# Patient Record
Sex: MALE | Race: WHITE | URBAN - METROPOLITAN AREA
[De-identification: names, ages, dates, MRNs, and addresses within clinical notes are randomized per-mention and may not be internally consistent; named-entity substitution may affect disease eponyms.]

---

## 2017-04-13 RX ORDER — OFLOXACIN 0.3 %
1 DROPS OPHTHALMIC (EYE)
Qty: 0 | Refills: 0 | Status: COMPLETED | OUTPATIENT
Start: 2017-04-24 | End: 2017-04-24

## 2017-04-13 RX ORDER — CYCLOPENTOLATE HYDROCHLORIDE 10 MG/ML
1 SOLUTION/ DROPS OPHTHALMIC
Qty: 0 | Refills: 0 | Status: COMPLETED | OUTPATIENT
Start: 2017-04-24 | End: 2017-04-24

## 2017-04-13 RX ORDER — TROPICAMIDE 1 %
1 DROPS OPHTHALMIC (EYE)
Qty: 0 | Refills: 0 | Status: COMPLETED | OUTPATIENT
Start: 2017-04-24 | End: 2017-04-24

## 2017-04-13 RX ORDER — PHENYLEPHRINE HCL 2.5 %
1 DROPS OPHTHALMIC (EYE)
Qty: 0 | Refills: 0 | Status: COMPLETED | OUTPATIENT
Start: 2017-04-24 | End: 2017-04-24

## 2017-04-21 NOTE — ASU PATIENT PROFILE, ADULT - PSH
Aneurysm of right iliac artery    H/O peripheral vascular disease    S/P CABG (coronary artery bypass graft)

## 2017-04-24 ENCOUNTER — OUTPATIENT (OUTPATIENT)
Dept: OUTPATIENT SERVICES | Facility: HOSPITAL | Age: 82
LOS: 1 days | Discharge: HOME | End: 2017-04-24

## 2017-04-24 VITALS
HEART RATE: 63 BPM | SYSTOLIC BLOOD PRESSURE: 114 MMHG | OXYGEN SATURATION: 98 % | RESPIRATION RATE: 15 BRPM | TEMPERATURE: 97 F | DIASTOLIC BLOOD PRESSURE: 51 MMHG

## 2017-04-24 VITALS
TEMPERATURE: 97 F | WEIGHT: 160.06 LBS | OXYGEN SATURATION: 100 % | HEIGHT: 70 IN | HEART RATE: 59 BPM | RESPIRATION RATE: 16 BRPM | SYSTOLIC BLOOD PRESSURE: 115 MMHG | DIASTOLIC BLOOD PRESSURE: 69 MMHG

## 2017-04-24 DIAGNOSIS — I72.3 ANEURYSM OF ILIAC ARTERY: Chronic | ICD-10-CM

## 2017-04-24 DIAGNOSIS — Z86.79 PERSONAL HISTORY OF OTHER DISEASES OF THE CIRCULATORY SYSTEM: Chronic | ICD-10-CM

## 2017-04-24 DIAGNOSIS — Z95.1 PRESENCE OF AORTOCORONARY BYPASS GRAFT: Chronic | ICD-10-CM

## 2017-04-24 RX ORDER — CLOPIDOGREL BISULFATE 75 MG/1
1 TABLET, FILM COATED ORAL
Qty: 0 | Refills: 0 | COMMUNITY

## 2017-04-24 RX ORDER — ATORVASTATIN CALCIUM 80 MG/1
1 TABLET, FILM COATED ORAL
Qty: 0 | Refills: 0 | COMMUNITY

## 2017-04-24 RX ORDER — PREGABALIN 225 MG/1
1 CAPSULE ORAL
Qty: 0 | Refills: 0 | COMMUNITY

## 2017-04-24 RX ORDER — ASPIRIN/CALCIUM CARB/MAGNESIUM 324 MG
1 TABLET ORAL
Qty: 0 | Refills: 0 | COMMUNITY

## 2017-04-24 RX ORDER — CHOLECALCIFEROL (VITAMIN D3) 125 MCG
1 CAPSULE ORAL
Qty: 0 | Refills: 0 | COMMUNITY

## 2017-04-24 RX ORDER — UBIDECARENONE 100 MG
1 CAPSULE ORAL
Qty: 0 | Refills: 0 | COMMUNITY

## 2017-04-24 RX ORDER — ACETAMINOPHEN 500 MG
650 TABLET ORAL EVERY 6 HOURS
Qty: 0 | Refills: 0 | Status: DISCONTINUED | OUTPATIENT
Start: 2017-04-24 | End: 2017-05-09

## 2017-04-24 RX ADMIN — CYCLOPENTOLATE HYDROCHLORIDE 1 DROP(S): 10 SOLUTION/ DROPS OPHTHALMIC at 15:23

## 2017-04-24 RX ADMIN — Medication 1 DROP(S): at 14:55

## 2017-04-24 RX ADMIN — Medication 1 DROP(S): at 15:14

## 2017-04-24 RX ADMIN — Medication 1 DROP(S): at 15:15

## 2017-04-24 RX ADMIN — CYCLOPENTOLATE HYDROCHLORIDE 1 DROP(S): 10 SOLUTION/ DROPS OPHTHALMIC at 15:05

## 2017-04-24 RX ADMIN — Medication 1 DROP(S): at 15:23

## 2017-04-24 RX ADMIN — Medication 1 DROP(S): at 15:05

## 2017-04-24 RX ADMIN — CYCLOPENTOLATE HYDROCHLORIDE 1 DROP(S): 10 SOLUTION/ DROPS OPHTHALMIC at 15:14

## 2017-04-24 NOTE — ASU DISCHARGE PLAN (ADULT/PEDIATRIC). - NURSING INSTRUCTIONS
Begin with liquids and light food ( tea, toast, Jello, soups). Advance to what you normally eat. Liquids should taken in adequate amounts today.     CALL the DOCTOR:    -Fever greater than  101F  - Signs  of infection such as : increase pain,swelling,redness,or a bad  smell coming from the wound.  -Excessive amount of bleeding.  - Any pain that appears to be getting worse.  - Vomiting  -  If you have  not urinated 8 hours after surgery or have any difficulty urinating.     A responsible adult should be with you for the rest of the day and night for your safety and to help you if you needed.    Review attached FACT SHEET if applicable.   For any problems or concerns,contact your doctor. [facility] patients should call the [facility]. If you cannot reach the doctor or clinic, call [facility] Emergency Department at [phone] or go to your local Emergency Department.  A responsible adult should be with you for the rest of the day and night for your safety and to help you if you needed. Resume your medications as listed on the attached Medication Record.

## 2017-04-24 NOTE — BRIEF OPERATIVE NOTE - PROCEDURE
Cataract extract, extracaps, phacoemuls, w/ prosth lens insertn, 1 stage  04/24/2017    Active  KRISTEN

## 2017-04-24 NOTE — BRIEF OPERATIVE NOTE - PRE-OP DX
Age-related nuclear cataract of right eye  04/24/2017    Juanjo Melchor  Cataract (lens) fragments in eye following cataract surgery, right eye  04/24/2017    Juanjo Melchor

## 2017-04-27 DIAGNOSIS — H25.11 AGE-RELATED NUCLEAR CATARACT, RIGHT EYE: ICD-10-CM

## 2017-04-27 DIAGNOSIS — Z95.1 PRESENCE OF AORTOCORONARY BYPASS GRAFT: ICD-10-CM

## 2017-04-27 DIAGNOSIS — Z87.891 PERSONAL HISTORY OF NICOTINE DEPENDENCE: ICD-10-CM

## 2017-04-27 DIAGNOSIS — Z95.5 PRESENCE OF CORONARY ANGIOPLASTY IMPLANT AND GRAFT: ICD-10-CM

## 2017-04-27 DIAGNOSIS — I25.10 ATHEROSCLEROTIC HEART DISEASE OF NATIVE CORONARY ARTERY WITHOUT ANGINA PECTORIS: ICD-10-CM

## 2017-04-27 DIAGNOSIS — E78.5 HYPERLIPIDEMIA, UNSPECIFIED: ICD-10-CM

## 2017-04-27 DIAGNOSIS — I10 ESSENTIAL (PRIMARY) HYPERTENSION: ICD-10-CM

## 2017-04-27 DIAGNOSIS — Z86.718 PERSONAL HISTORY OF OTHER VENOUS THROMBOSIS AND EMBOLISM: ICD-10-CM

## 2024-03-14 ENCOUNTER — INPATIENT (INPATIENT)
Facility: HOSPITAL | Age: 89
LOS: 6 days | Discharge: NURSING, REHAB OR LONG-TERM CARE | DRG: 535 | End: 2024-03-21
Attending: SURGERY | Admitting: SURGERY
Payer: MEDICARE

## 2024-03-14 VITALS
WEIGHT: 145.06 LBS | RESPIRATION RATE: 24 BRPM | HEIGHT: 70 IN | OXYGEN SATURATION: 92 % | DIASTOLIC BLOOD PRESSURE: 47 MMHG | SYSTOLIC BLOOD PRESSURE: 106 MMHG | HEART RATE: 73 BPM

## 2024-03-14 DIAGNOSIS — Z95.1 PRESENCE OF AORTOCORONARY BYPASS GRAFT: Chronic | ICD-10-CM

## 2024-03-14 DIAGNOSIS — I72.3 ANEURYSM OF ILIAC ARTERY: Chronic | ICD-10-CM

## 2024-03-14 DIAGNOSIS — R57.8 OTHER SHOCK: ICD-10-CM

## 2024-03-14 DIAGNOSIS — Z86.79 PERSONAL HISTORY OF OTHER DISEASES OF THE CIRCULATORY SYSTEM: Chronic | ICD-10-CM

## 2024-03-14 LAB
ALBUMIN SERPL ELPH-MCNC: 3.2 G/DL — LOW (ref 3.3–5)
ALP SERPL-CCNC: 104 U/L — SIGNIFICANT CHANGE UP (ref 40–120)
ALT FLD-CCNC: 46 U/L — SIGNIFICANT CHANGE UP (ref 12–78)
ANION GAP SERPL CALC-SCNC: 9 MMOL/L — SIGNIFICANT CHANGE UP (ref 5–17)
ANISOCYTOSIS BLD QL: SIGNIFICANT CHANGE UP
APPEARANCE UR: CLEAR — SIGNIFICANT CHANGE UP
APTT BLD: 25.5 SEC — SIGNIFICANT CHANGE UP (ref 24.5–35.6)
AST SERPL-CCNC: 74 U/L — HIGH (ref 15–37)
BACTERIA # UR AUTO: NEGATIVE /HPF — SIGNIFICANT CHANGE UP
BASE EXCESS BLDV CALC-SCNC: -4 MMOL/L — LOW (ref -2–3)
BASE EXCESS BLDV CALC-SCNC: -4.6 MMOL/L — LOW (ref -2–3)
BASOPHILS # BLD AUTO: 0.01 K/UL — SIGNIFICANT CHANGE UP (ref 0–0.2)
BASOPHILS NFR BLD AUTO: 0.1 % — SIGNIFICANT CHANGE UP (ref 0–2)
BILIRUB SERPL-MCNC: 1.7 MG/DL — HIGH (ref 0.2–1.2)
BILIRUB UR-MCNC: NEGATIVE — SIGNIFICANT CHANGE UP
BIZARRE PLATELETS BLD QL SMEAR: PRESENT — SIGNIFICANT CHANGE UP
BLD GP AB SCN SERPL QL: SIGNIFICANT CHANGE UP
BLOOD GAS COMMENTS, VENOUS: SIGNIFICANT CHANGE UP
BUN SERPL-MCNC: 53 MG/DL — HIGH (ref 7–23)
BURR CELLS BLD QL SMEAR: PRESENT — SIGNIFICANT CHANGE UP
CALCIUM SERPL-MCNC: 8.6 MG/DL — SIGNIFICANT CHANGE UP (ref 8.5–10.1)
CAST: 43 /LPF — HIGH (ref 0–4)
CHLORIDE SERPL-SCNC: 110 MMOL/L — HIGH (ref 96–108)
CK SERPL-CCNC: 2097 U/L — HIGH (ref 26–308)
CK SERPL-CCNC: 2106 U/L — HIGH (ref 26–308)
CO2 SERPL-SCNC: 21 MMOL/L — LOW (ref 22–31)
COLOR SPEC: YELLOW — SIGNIFICANT CHANGE UP
CREAT SERPL-MCNC: 2.35 MG/DL — HIGH (ref 0.5–1.3)
DIFF PNL FLD: ABNORMAL
EGFR: 25 ML/MIN/1.73M2 — LOW
ELLIPTOCYTES BLD QL SMEAR: SLIGHT — SIGNIFICANT CHANGE UP
EOSINOPHIL # BLD AUTO: 0 K/UL — SIGNIFICANT CHANGE UP (ref 0–0.5)
EOSINOPHIL NFR BLD AUTO: 0 % — SIGNIFICANT CHANGE UP (ref 0–6)
FLUAV AG NPH QL: SIGNIFICANT CHANGE UP
FLUBV AG NPH QL: SIGNIFICANT CHANGE UP
GAS PNL BLDV: SIGNIFICANT CHANGE UP
GLUCOSE SERPL-MCNC: 109 MG/DL — HIGH (ref 70–99)
GLUCOSE UR QL: NEGATIVE MG/DL — SIGNIFICANT CHANGE UP
HCO3 BLDV-SCNC: 19 MMOL/L — LOW (ref 22–29)
HCO3 BLDV-SCNC: 20 MMOL/L — LOW (ref 22–29)
HCT VFR BLD CALC: 19.3 % — CRITICAL LOW (ref 39–50)
HCT VFR BLD CALC: 19.8 % — CRITICAL LOW (ref 39–50)
HGB BLD-MCNC: 5.9 G/DL — CRITICAL LOW (ref 13–17)
HGB BLD-MCNC: 6.5 G/DL — CRITICAL LOW (ref 13–17)
HYALINE CASTS # UR AUTO: PRESENT
HYPOCHROMIA BLD QL: SIGNIFICANT CHANGE UP
IMM GRANULOCYTES NFR BLD AUTO: 0.3 % — SIGNIFICANT CHANGE UP (ref 0–0.9)
INR BLD: 1.27 RATIO — HIGH (ref 0.85–1.18)
KETONES UR-MCNC: NEGATIVE MG/DL — SIGNIFICANT CHANGE UP
LACTATE SERPL-SCNC: 1.7 MMOL/L — SIGNIFICANT CHANGE UP (ref 0.7–2)
LACTATE SERPL-SCNC: 2.7 MMOL/L — HIGH (ref 0.7–2)
LEUKOCYTE ESTERASE UR-ACNC: NEGATIVE — SIGNIFICANT CHANGE UP
LYMPHOCYTES # BLD AUTO: 0.53 K/UL — LOW (ref 1–3.3)
LYMPHOCYTES # BLD AUTO: 4.9 % — LOW (ref 13–44)
MACROCYTES BLD QL: SLIGHT — SIGNIFICANT CHANGE UP
MAGNESIUM SERPL-MCNC: 2.4 MG/DL — SIGNIFICANT CHANGE UP (ref 1.6–2.6)
MANUAL SMEAR VERIFICATION: SIGNIFICANT CHANGE UP
MCHC RBC-ENTMCNC: 25.5 PG — LOW (ref 27–34)
MCHC RBC-ENTMCNC: 26.4 PG — LOW (ref 27–34)
MCHC RBC-ENTMCNC: 30.6 GM/DL — LOW (ref 32–36)
MCHC RBC-ENTMCNC: 32.8 GM/DL — SIGNIFICANT CHANGE UP (ref 32–36)
MCV RBC AUTO: 80.5 FL — SIGNIFICANT CHANGE UP (ref 80–100)
MCV RBC AUTO: 83.5 FL — SIGNIFICANT CHANGE UP (ref 80–100)
MICROCYTES BLD QL: SIGNIFICANT CHANGE UP
MONOCYTES # BLD AUTO: 0.82 K/UL — SIGNIFICANT CHANGE UP (ref 0–0.9)
MONOCYTES NFR BLD AUTO: 7.6 % — SIGNIFICANT CHANGE UP (ref 2–14)
NEUTROPHILS # BLD AUTO: 9.36 K/UL — HIGH (ref 1.8–7.4)
NEUTROPHILS NFR BLD AUTO: 87.1 % — HIGH (ref 43–77)
NITRITE UR-MCNC: NEGATIVE — SIGNIFICANT CHANGE UP
NT-PROBNP SERPL-SCNC: 4291 PG/ML — HIGH (ref 0–450)
OVALOCYTES BLD QL SMEAR: SLIGHT — SIGNIFICANT CHANGE UP
PCO2 BLDV: 32 MMHG — LOW (ref 42–55)
PCO2 BLDV: 32 MMHG — LOW (ref 42–55)
PH BLDV: 7.39 — SIGNIFICANT CHANGE UP (ref 7.32–7.43)
PH BLDV: 7.4 — SIGNIFICANT CHANGE UP (ref 7.32–7.43)
PH UR: 5 — SIGNIFICANT CHANGE UP (ref 5–8)
PHOSPHATE SERPL-MCNC: 5.6 MG/DL — HIGH (ref 2.5–4.5)
PLAT MORPH BLD: NORMAL — SIGNIFICANT CHANGE UP
PLATELET # BLD AUTO: 103 K/UL — LOW (ref 150–400)
PLATELET # BLD AUTO: 116 K/UL — LOW (ref 150–400)
PO2 BLDV: 144 MMHG — HIGH (ref 25–45)
PO2 BLDV: 204 MMHG — HIGH (ref 25–45)
POIKILOCYTOSIS BLD QL AUTO: SIGNIFICANT CHANGE UP
POLYCHROMASIA BLD QL SMEAR: SLIGHT — SIGNIFICANT CHANGE UP
POTASSIUM SERPL-MCNC: 4.7 MMOL/L — SIGNIFICANT CHANGE UP (ref 3.5–5.3)
POTASSIUM SERPL-SCNC: 4.7 MMOL/L — SIGNIFICANT CHANGE UP (ref 3.5–5.3)
PROT SERPL-MCNC: 5.6 GM/DL — LOW (ref 6–8.3)
PROT UR-MCNC: NEGATIVE MG/DL — SIGNIFICANT CHANGE UP
PROTHROM AB SERPL-ACNC: 14.2 SEC — HIGH (ref 9.5–13)
RBC # BLD: 2.31 M/UL — LOW (ref 4.2–5.8)
RBC # BLD: 2.46 M/UL — LOW (ref 4.2–5.8)
RBC # FLD: 17.7 % — HIGH (ref 10.3–14.5)
RBC # FLD: 18.2 % — HIGH (ref 10.3–14.5)
RBC BLD AUTO: ABNORMAL
RBC CASTS # UR COMP ASSIST: 38 /HPF — HIGH (ref 0–4)
ROULEAUX BLD QL SMEAR: PRESENT
RSV RNA NPH QL NAA+NON-PROBE: SIGNIFICANT CHANGE UP
SAO2 % BLDV: 98 % — HIGH (ref 67–88)
SAO2 % BLDV: 98 % — HIGH (ref 67–88)
SARS-COV-2 RNA SPEC QL NAA+PROBE: SIGNIFICANT CHANGE UP
SODIUM SERPL-SCNC: 140 MMOL/L — SIGNIFICANT CHANGE UP (ref 135–145)
SP GR SPEC: 1.02 — SIGNIFICANT CHANGE UP (ref 1–1.03)
SPHEROCYTES BLD QL SMEAR: SIGNIFICANT CHANGE UP
SQUAMOUS # UR AUTO: 1 /HPF — SIGNIFICANT CHANGE UP (ref 0–5)
TARGETS BLD QL SMEAR: SLIGHT — SIGNIFICANT CHANGE UP
TOXIC GRANULES BLD QL SMEAR: PRESENT — SIGNIFICANT CHANGE UP
TROPONIN I, HIGH SENSITIVITY RESULT: 1079.88 NG/L — HIGH
TROPONIN I, HIGH SENSITIVITY RESULT: 393.8 NG/L — HIGH
TSH SERPL-MCNC: 5.82 UU/ML — HIGH (ref 0.34–4.82)
UROBILINOGEN FLD QL: NORMAL MG/DL — SIGNIFICANT CHANGE UP (ref 0.2–1)
WBC # BLD: 10.75 K/UL — HIGH (ref 3.8–10.5)
WBC # BLD: 9.6 K/UL — SIGNIFICANT CHANGE UP (ref 3.8–10.5)
WBC # FLD AUTO: 10.75 K/UL — HIGH (ref 3.8–10.5)
WBC # FLD AUTO: 9.6 K/UL — SIGNIFICANT CHANGE UP (ref 3.8–10.5)
WBC UR QL: 8 /HPF — HIGH (ref 0–5)

## 2024-03-14 PROCEDURE — 86922 COMPATIBILITY TEST ANTIGLOB: CPT

## 2024-03-14 PROCEDURE — 99223 1ST HOSP IP/OBS HIGH 75: CPT | Mod: GC

## 2024-03-14 PROCEDURE — 86920 COMPATIBILITY TEST SPIN: CPT

## 2024-03-14 PROCEDURE — 85027 COMPLETE CBC AUTOMATED: CPT

## 2024-03-14 PROCEDURE — 73552 X-RAY EXAM OF FEMUR 2/>: CPT | Mod: 26,LT

## 2024-03-14 PROCEDURE — 70450 CT HEAD/BRAIN W/O DYE: CPT | Mod: 26,MC

## 2024-03-14 PROCEDURE — 93010 ELECTROCARDIOGRAM REPORT: CPT

## 2024-03-14 PROCEDURE — 83605 ASSAY OF LACTIC ACID: CPT

## 2024-03-14 PROCEDURE — 73610 X-RAY EXAM OF ANKLE: CPT | Mod: LT

## 2024-03-14 PROCEDURE — 73562 X-RAY EXAM OF KNEE 3: CPT | Mod: LT

## 2024-03-14 PROCEDURE — 71045 X-RAY EXAM CHEST 1 VIEW: CPT | Mod: 26

## 2024-03-14 PROCEDURE — 99285 EMERGENCY DEPT VISIT HI MDM: CPT

## 2024-03-14 PROCEDURE — 84480 ASSAY TRIIODOTHYRONINE (T3): CPT

## 2024-03-14 PROCEDURE — P9016: CPT

## 2024-03-14 PROCEDURE — 80053 COMPREHEN METABOLIC PANEL: CPT

## 2024-03-14 PROCEDURE — 85730 THROMBOPLASTIN TIME PARTIAL: CPT

## 2024-03-14 PROCEDURE — 93306 TTE W/DOPPLER COMPLETE: CPT

## 2024-03-14 PROCEDURE — 87086 URINE CULTURE/COLONY COUNT: CPT

## 2024-03-14 PROCEDURE — 83735 ASSAY OF MAGNESIUM: CPT

## 2024-03-14 PROCEDURE — 86921 COMPATIBILITY TEST INCUBATE: CPT

## 2024-03-14 PROCEDURE — 72125 CT NECK SPINE W/O DYE: CPT | Mod: 26,MC

## 2024-03-14 PROCEDURE — 36430 TRANSFUSION BLD/BLD COMPNT: CPT

## 2024-03-14 PROCEDURE — 87040 BLOOD CULTURE FOR BACTERIA: CPT

## 2024-03-14 PROCEDURE — 73590 X-RAY EXAM OF LOWER LEG: CPT | Mod: 26,LT

## 2024-03-14 PROCEDURE — 92610 EVALUATE SWALLOWING FUNCTION: CPT | Mod: GN

## 2024-03-14 PROCEDURE — 85610 PROTHROMBIN TIME: CPT

## 2024-03-14 PROCEDURE — 85025 COMPLETE CBC W/AUTO DIFF WBC: CPT

## 2024-03-14 PROCEDURE — 73610 X-RAY EXAM OF ANKLE: CPT | Mod: 26,LT

## 2024-03-14 PROCEDURE — 74177 CT ABD & PELVIS W/CONTRAST: CPT | Mod: 26,MC

## 2024-03-14 PROCEDURE — 99291 CRITICAL CARE FIRST HOUR: CPT

## 2024-03-14 PROCEDURE — 80048 BASIC METABOLIC PNL TOTAL CA: CPT

## 2024-03-14 PROCEDURE — 80202 ASSAY OF VANCOMYCIN: CPT

## 2024-03-14 PROCEDURE — 72170 X-RAY EXAM OF PELVIS: CPT

## 2024-03-14 PROCEDURE — 84484 ASSAY OF TROPONIN QUANT: CPT

## 2024-03-14 PROCEDURE — 73552 X-RAY EXAM OF FEMUR 2/>: CPT | Mod: LT

## 2024-03-14 PROCEDURE — 97116 GAIT TRAINING THERAPY: CPT | Mod: GP

## 2024-03-14 PROCEDURE — 87186 SC STD MICRODIL/AGAR DIL: CPT

## 2024-03-14 PROCEDURE — 73502 X-RAY EXAM HIP UNI 2-3 VIEWS: CPT | Mod: LT

## 2024-03-14 PROCEDURE — 71260 CT THORAX DX C+: CPT | Mod: 26,MC

## 2024-03-14 PROCEDURE — 73562 X-RAY EXAM OF KNEE 3: CPT | Mod: 26,LT

## 2024-03-14 PROCEDURE — 84436 ASSAY OF TOTAL THYROXINE: CPT

## 2024-03-14 PROCEDURE — 73502 X-RAY EXAM HIP UNI 2-3 VIEWS: CPT | Mod: 26,LT

## 2024-03-14 PROCEDURE — 36415 COLL VENOUS BLD VENIPUNCTURE: CPT

## 2024-03-14 PROCEDURE — 97162 PT EVAL MOD COMPLEX 30 MIN: CPT | Mod: GP

## 2024-03-14 PROCEDURE — 82550 ASSAY OF CK (CPK): CPT

## 2024-03-14 PROCEDURE — 84439 ASSAY OF FREE THYROXINE: CPT

## 2024-03-14 PROCEDURE — 84100 ASSAY OF PHOSPHORUS: CPT

## 2024-03-14 PROCEDURE — 73590 X-RAY EXAM OF LOWER LEG: CPT | Mod: LT

## 2024-03-14 PROCEDURE — 97530 THERAPEUTIC ACTIVITIES: CPT | Mod: GP

## 2024-03-14 PROCEDURE — 92523 SPEECH SOUND LANG COMPREHEN: CPT | Mod: GN

## 2024-03-14 PROCEDURE — 86923 COMPATIBILITY TEST ELECTRIC: CPT

## 2024-03-14 PROCEDURE — 81001 URINALYSIS AUTO W/SCOPE: CPT

## 2024-03-14 PROCEDURE — 82803 BLOOD GASES ANY COMBINATION: CPT

## 2024-03-14 RX ORDER — CHOLECALCIFEROL (VITAMIN D3) 125 MCG
1 CAPSULE ORAL
Refills: 0 | DISCHARGE

## 2024-03-14 RX ORDER — SODIUM CHLORIDE 9 MG/ML
1000 INJECTION, SOLUTION INTRAVENOUS ONCE
Refills: 0 | Status: DISCONTINUED | OUTPATIENT
Start: 2024-03-14 | End: 2024-03-14

## 2024-03-14 RX ORDER — MULTIVIT-MIN/FERROUS GLUCONATE 9 MG/15 ML
1 LIQUID (ML) ORAL
Refills: 0 | DISCHARGE

## 2024-03-14 RX ORDER — ACETAMINOPHEN 500 MG
1000 TABLET ORAL ONCE
Refills: 0 | Status: DISCONTINUED | OUTPATIENT
Start: 2024-03-14 | End: 2024-03-21

## 2024-03-14 RX ORDER — ASPIRIN/CALCIUM CARB/MAGNESIUM 324 MG
1 TABLET ORAL
Refills: 0 | DISCHARGE

## 2024-03-14 RX ORDER — DESMOPRESSIN ACETATE 0.1 MG/1
20 TABLET ORAL ONCE
Refills: 0 | Status: COMPLETED | OUTPATIENT
Start: 2024-03-14 | End: 2024-03-15

## 2024-03-14 RX ORDER — ACETAMINOPHEN 500 MG
1000 TABLET ORAL ONCE
Refills: 0 | Status: COMPLETED | OUTPATIENT
Start: 2024-03-14 | End: 2024-03-14

## 2024-03-14 RX ORDER — ONDANSETRON 8 MG/1
4 TABLET, FILM COATED ORAL EVERY 6 HOURS
Refills: 0 | Status: DISCONTINUED | OUTPATIENT
Start: 2024-03-14 | End: 2024-03-21

## 2024-03-14 RX ORDER — GABAPENTIN 400 MG/1
100 CAPSULE ORAL THREE TIMES A DAY
Refills: 0 | Status: DISCONTINUED | OUTPATIENT
Start: 2024-03-14 | End: 2024-03-21

## 2024-03-14 RX ORDER — PIPERACILLIN AND TAZOBACTAM 4; .5 G/20ML; G/20ML
3.38 INJECTION, POWDER, LYOPHILIZED, FOR SOLUTION INTRAVENOUS ONCE
Refills: 0 | Status: COMPLETED | OUTPATIENT
Start: 2024-03-14 | End: 2024-03-14

## 2024-03-14 RX ORDER — DULOXETINE HYDROCHLORIDE 30 MG/1
1 CAPSULE, DELAYED RELEASE ORAL
Refills: 0 | DISCHARGE

## 2024-03-14 RX ORDER — HYDROMORPHONE HYDROCHLORIDE 2 MG/ML
0.5 INJECTION INTRAMUSCULAR; INTRAVENOUS; SUBCUTANEOUS EVERY 4 HOURS
Refills: 0 | Status: DISCONTINUED | OUTPATIENT
Start: 2024-03-14 | End: 2024-03-19

## 2024-03-14 RX ORDER — UBIDECARENONE 100 MG
1 CAPSULE ORAL
Refills: 0 | DISCHARGE

## 2024-03-14 RX ORDER — VANCOMYCIN HCL 1 G
1000 VIAL (EA) INTRAVENOUS ONCE
Refills: 0 | Status: COMPLETED | OUTPATIENT
Start: 2024-03-14 | End: 2024-03-14

## 2024-03-14 RX ORDER — SODIUM CHLORIDE 9 MG/ML
1000 INJECTION, SOLUTION INTRAVENOUS
Refills: 0 | Status: DISCONTINUED | OUTPATIENT
Start: 2024-03-14 | End: 2024-03-14

## 2024-03-14 RX ORDER — HYDROCORTISONE 20 MG
100 TABLET ORAL ONCE
Refills: 0 | Status: COMPLETED | OUTPATIENT
Start: 2024-03-14 | End: 2024-03-14

## 2024-03-14 RX ORDER — ATORVASTATIN CALCIUM 80 MG/1
1 TABLET, FILM COATED ORAL
Refills: 0 | DISCHARGE

## 2024-03-14 RX ORDER — DESMOPRESSIN ACETATE 0.1 MG/1
20 TABLET ORAL ONCE
Refills: 0 | Status: DISCONTINUED | OUTPATIENT
Start: 2024-03-14 | End: 2024-03-14

## 2024-03-14 RX ORDER — LIDOCAINE 4 G/100G
1 CREAM TOPICAL DAILY
Refills: 0 | Status: DISCONTINUED | OUTPATIENT
Start: 2024-03-14 | End: 2024-03-21

## 2024-03-14 RX ADMIN — Medication 1000 MILLIGRAM(S): at 23:10

## 2024-03-14 RX ADMIN — PIPERACILLIN AND TAZOBACTAM 200 GRAM(S): 4; .5 INJECTION, POWDER, LYOPHILIZED, FOR SOLUTION INTRAVENOUS at 16:35

## 2024-03-14 RX ADMIN — Medication 100 MILLIGRAM(S): at 17:10

## 2024-03-14 RX ADMIN — Medication 250 MILLIGRAM(S): at 17:10

## 2024-03-14 RX ADMIN — Medication 400 MILLIGRAM(S): at 22:55

## 2024-03-14 NOTE — PATIENT PROFILE ADULT - FALL HARM RISK - HARM RISK INTERVENTIONS
Communicate Risk of Fall with Harm to all staff/Discuss with provider need for PT consult/Monitor for mental status changes/Monitor gait and stability/Move patient closer to nurses' station/Provide patient with walking aids - walker, cane, crutches/Reinforce activity limits and safety measures with patient and family/Reorient to person, place and time as needed/Review medications for side effects contributing to fall risk/Tailored Fall Risk Interventions/Toileting schedule using arm’s reach rule for commode and bathroom/Use of alarms - bed, chair and/or voice tab/Visual Cue: Yellow wristband and red socks/Bed in lowest position, wheels locked, appropriate side rails in place/Call bell, personal items and telephone in reach/Instruct patient to call for assistance before getting out of bed or chair/Physically safe environment - no spills, clutter or unnecessary equipment/Purposeful Proactive Rounding/Room/bathroom lighting operational, light cord in reach/Unable to comprehend

## 2024-03-14 NOTE — ED ADULT TRIAGE NOTE - CHIEF COMPLAINT QUOTE
pt BIBEMS form home s/p unwitnessed fall at 0700 this morning. pt with hx dementia, private aide reports pt will normally get up and walk around but did not today, so she called EMS. pt awake and alert in triage denies pain at this time. pt able to move all extremities. MD Pimentel in triage. neuro alert called [id number]. pt taken directly to CT scan and will be placed in view of nurses' station for safety. pt BIBEMS form home s/p unwitnessed fall at 0700 this morning. pt with hx dementia, private aide reports pt will normally get up and walk around but did not today, so she called EMS. pt awake and alert in triage denies pain at this time. pt able to move all extremities. MD Pimentel in triage. neuro alert called [id number].

## 2024-03-14 NOTE — H&P ADULT - ASSESSMENT
89yo M presents s/p unwitnessed fall  primary survey intact  CT demonstrated communited left acetabular/pelvic fracture with moderate hematoma in left retroperitoneum/pelvis/iliopsoas muscle  CT head negative    Plan:  - admit to trauma to ICU. hemorrhage protocol  - pain control prn  - monitor VS  - cardiology consult  - diet: NPO  - continue IVF  - orthopedic surgery consult  - SCD for DVT PPX, hold pharmaceutical ppx and aspirin    Plan discussed with trauma surgery team and attending, Dr. Restrepo 89yo M presents s/p unwitnessed fall  primary survey intact  CT demonstrated communited left acetabular/pelvic fracture with moderate hematoma in left retroperitoneum/pelvis/iliopsoas muscle  CT head negative    Per family discussion, discussed with daughter, family agrees with conservative treatment, but would not want any surgical intervention. verbal DNR/DNI    Plan:  - admit to trauma to ICU. hemorrhage protocol  - pain control prn  - monitor VS  - cardiology consult  - diet: NPO  - continue IVF  - orthopedic surgery consult  - SCD for DVT PPX, hold pharmaceutical ppx and aspirin    Plan discussed with trauma surgery team and attending, Dr. Restrepo

## 2024-03-14 NOTE — PROGRESS NOTE ADULT - ASSESSMENT
89 year old on aspirin s/p fall last night  with acetabular, rami fracture displaced with Acute blood loss anemia  hgb now 5.9, unclear what it was on admission  creatinine 2.3, no known kidney disease  JVD  dec lv function on POCUS  jvvd  Patient not sob    1. transfuse, serial h/h  2. ddavp  3. possible rhabdo will trend cpk  4/ concern over chf jvd, inc troponin  4. BALWINDER monitor, got contrast  5. Grahamt DNR/DNI    d/w trauma surgeryy Dr. Restrepo

## 2024-03-14 NOTE — CONSULT NOTE ADULT - SUBJECTIVE AND OBJECTIVE BOX
ORTHO CONSULT NOTE:    Patient is a 89yMale DNR/DNI who presents to [place] ED s/p unwitnessed fall. Initially hypotensive w/ MAPs 60s and rectal T 94.4F in trauma bay - MTP was initiated. Pt stabilized w/ improved MAPs and SBPs to 90-100s at bedside. CTA was conducted which revealed a retroperitoneal/pelvic hematoma without extravasation as well as a L acetabular fx, prompting consult. Pt subjective limited 2/2 mental status, currently AOx2 and does not remember events. Denying numbness/tingling or pain anywhere other than L hip at this time. Spoke to family at bedside who reported unwitnessed fall and baseline ambulatory status of minimal ambulator w/ a walker. States inability to walk immediately following the injury. No other orthopedic concerns at this time.    HTN (hypertension)    Hyperlipidemia    CAD (coronary artery disease)        No Known Allergies      PHYSICAL EXAM:  T(C): 35.5 (03-14-24 @ 19:45), Max: 35.5 (03-14-24 @ 19:45)  HR: 72 (03-14-24 @ 19:45) (63 - 73)  BP: 125/84 (03-14-24 @ 19:45) (106/47 - 125/84)  RR: 22 (03-14-24 @ 19:45) (15 - 26)  SpO2: 100% (03-14-24 @ 19:45) (92% - 100%)    Gen: AOx2, Resting comfortably w/ Yudi hugger    LLE:  Skin intact, no erythema or ecchymosis appreciated at area of injury  TTP throughout R hip and pelvis, otherwise NTTP  +EHL/FHL/TA/GSC  +SILT L3-S1  + DP/PT  Compartments soft and compressible  No calf tenderness    Secondary Assessment:  NC/AT, NTTP of clavicles, NTTP of C-,T-,L-Spine, NTTP of Pelvis  UEs: +superficial abrasion at R elbow, +multiple ecchymotic regions, NTTP of Shoulders, Elbows, Wrists, Hands; NT with AROM/PROM of Shoulders, Elbows, Wrists, Hands; AIN/PIN/Med/Uln/Msc/Rad/Ax intact  RLE: Able to SLR, NT with Log Roll, NT with Heel Strike, NTTP of Hip, Knee, Ankle, Foot; NT with AROM/PROM of Hip, Knee, Ankle, Foot; Q/H/Gsc/TA/EHL/FHL intact     IMAGING:    CTA CAP: Comminuted left acetabular fx w/ slight intrapelvic shift of the femoral head in relation to contralateral side. No other acute obvious fxs appreciated.    XR Pelvis + L Hip/Fem/Knee/Tib/Ankle: Acetabular fx as mentioned. No other acute obvious fxs appreciated.    A/P: 89M w/ L comminuted acetabular fx  Admitted to ICU, appreciate primary care  Per conversation w/ trauma sx low concern for acute bleed given lack of extravasation on CTA - plan to monitor in ICU  If continued HD instability or further decompensates would recommend urgent IR consult for possible embolization  Family undecided re: surgical repair of acetabulum but leaning towards no sx - will follow up on decision  If surgical tx of fx is desired, will likely require transfer to level 1 trauma center for ORIF  Currently no acute ortho surgery/intervention indicated  Placed in Buck traction in the meantime for pain relief and improved hip positioning  NWB RLE  Multimodal analgesia  DVT ppx: per primary - recommend SCDs bilaterally in setting of hematoma  PT/OT when tolerable  Ice as tolerated  Discussed with attending Dr. Nair who is in agreement with above plan, will update as needed

## 2024-03-14 NOTE — H&P ADULT - HISTORY OF PRESENT ILLNESS
Trauma Surgery  Trauma Code  Trauma Activation Time: 3:25PM  Trauma Team Arrival Time: 3:17PM  Pre-Notification: 3:15PM  Patient Name: Buck Bravo  Identification: [id number], 89yo M  Location: trauma bay      89yo M w/ hx of CABG and EVAR biliac aneurysm repair presents s/p unwitnessed fall this morning. Most of history obtained from daughter. Patient was found on floor, denies loss of conscious or head strike. Patient is on aspirin. Patient currently complains of pain in lower abd.    A: Airway intact. Patient speaking in full complete sentences  B: Bilateral breath sounds on auscultation w/o tachypnea  C: +2 radial, ulnar, carotid, femoral, popliteal, DP/PT  D: GCS15, PERRLA, moves all 4 extremities    in ED, FAST negative. hypotensive MAP 60s, nontachycardia 70s. MTP activated due to hypotension, received 2u.  hypothermic, rectal temp 94.4F

## 2024-03-14 NOTE — H&P ADULT - NSHPLABSRESULTS_GEN_ALL_CORE
< from: CT Abdomen and Pelvis w/ IV Cont (03.14.24 @ 16:03) >    IMPRESSION:    Markedly comminuted left acetabular/left iliac bone fracture.    Moderate amount of hemorrhage in the left retroperitoneum, left pelvis   and left iliopsoas muscle.    Nondisplaced fracture of the left inferior pubic ramus.      < end of copied text >    < from: CT Head No Cont (03.14.24 @ 16:02) >    IMPRESSION:    CT HEAD: Mild periventricular white matter ischemia.    Moderate temporal   lobe atrophy.    CT cervical spine:   No vertebral fracture is recognized.  Moderate to   severe degenerative disc disease and spondylosis at C4-5 through C7-T1   with narrowing of the BILATERAL C3-4 through C7-T1 neural foramina due to   uncovertebral spurring and facet osteophytic hypertrophy. Marked RIGHT   C2-3 facet osteophytic hypertrophy is also noted. Posterior osteophytic   ridge/disc complexes at C4-5 through C7-T1 abut the ventral cord.      < end of copied text >

## 2024-03-14 NOTE — H&P ADULT - NSICDXPASTSURGICALHX_GEN_ALL_CORE_FT
PAST SURGICAL HISTORY:  Aneurysm of right iliac artery     H/O peripheral vascular disease     S/P CABG (coronary artery bypass graft)

## 2024-03-14 NOTE — ED PROVIDER NOTE - CLINICAL SUMMARY MEDICAL DECISION MAKING FREE TEXT BOX
Hx of CABG and AAA s/p repair presenting with unwitnessed fall found to be hypotensive and hypothermic. Pale appearing with tender abdomen. MAP from 55-60. HR normal. Satting well on RA. Cold to the touch. Lethargic but arousable, tracking and following commands. Mildly confused. Otherwise moving arms and legs. Conjunctival pallor. POCUS FAST negative, LVEF moderately diminished, no RV dilation, no pericardial effusion. Will check sepsis labs, warm him, antibiotics and steroids (possibly adrenal insuffiencey), MTP, trauma activation, TBA.

## 2024-03-14 NOTE — CONSULT NOTE ADULT - ASSESSMENT
ICU ASSESSMENT AND PLAN:   89yo M with PMH: CAD/CABG x3, s/p EVAR bilat iliac aneurysm repairs, Dementia, HTN, HLD, s/p cataract replacements, s/p fall July 2023 w admit to [facility], had lumbar spinal fracture nonoperative, went to rehab after hospital. Uses Walker at home "Most of the time"    Who presented to ED after being found on the floor by home aide when she arrived to house. Unclear how long he was down, patient unclear, he doesn't really remember, likely several hours with elevated CPKs.   Also noted to have elevated troponins to 393, ECG to be reviewed    Will admit to ICU with trauma service    1- s/p fall with complex pelvic fracture, hematoma, high risk for further bleeding  will hold on CT angio unless unstable/hypotensive with high risk for contrast associated nephropathy  2- s/p 2u PRBC with MTP called for trauma work up  3- Hemmorhage protocol with serial CBC ordered  4- Need to obtain medication list, daughter states he gets his medications from [facility] pharmacy on [address], will need to followup  5- Serial exams of abdomen/hips/legs to monitor for bleeding  6- elevated TSH, THyroid panel has been ordered  7- Noted BALWINDER, ?new vs old, patient/daughter unaware of recent kidney function, monitor closely, renal dose all medications  8- Dementia, risk of worsening delerium while in hospital, reorient/encourage as needed, maintain sleep/wake cycles as able  9- NPO for now  10- ED had family sign DNR/DNI/MOLST form, should be in chart    Case discussed with Dr. Andrei Rolle, ICU Attending

## 2024-03-14 NOTE — PATIENT PROFILE ADULT - FUNCTIONAL ASSESSMENT - DAILY ACTIVITY 1.
Social Work:  Received consult for discharge planning. Social Work met with patient in room, she reported would like this SWer to call dtr Stefany Frances. SW called dtr Debbie Foreman and [facility]. Discussed social work roles and discharge planning/transition of care. PT assisted by LUCILA last admission, went home with [address] 9/27/20. Lives alone, with aide 2 hr aide MTF. Family assists with transportation. Has DME pta tub bench, rollator, w/c, and raised commode. Discussed with dtr [place] vs [place] and choiced for both. Alexiaomkar Arceo would like [facility] if appropriate and liaison Marietta Bansal is following. Family to provide [place] choices tomorrow after reviewing today. All family questions answered. LUCILA provided update to pt.   RN provided with WET COVID test.  LUCILA/FEDERICO will continue to follow and assist.    Electronically signed by DEYANIRA Jacques on 10/28/2020 at 2:54 PM Unknown, pt is not reliant historian*

## 2024-03-14 NOTE — CONSULT NOTE ADULT - SUBJECTIVE AND OBJECTIVE BOX
Patient is a 89y old  Male who presents with a chief complaint of unwitnessed fall (14 Mar 2024 16:40)    BRIEF HOSPITAL COURSE:   89yo M with PMH: CAD/CABG x3, s/p EVAR bilat iliac aneurysm repairs, Dementia, HTN, HLD, s/p cataract replacements, s/p fall 2023 w admit to [facility], had lumbar spinal fracture nonoperative, went to rehab after hospital. Uses Walker at home "Most of the time"    Who presented to ED after being found on the floor by home aide when she arrived to house. Unclear how long he was down, patient unclear, he doesn't really remember, likely several hours with elevated CPKs.   Also noted to have elevated troponins to 393, ECG to be reviewed      PAST MEDICAL & SURGICAL HISTORY:  HTN (hypertension)  Hyperlipidemia  CAD (coronary artery disease)  S/P CABG (coronary artery bypass graft)  H/O peripheral vascular disease  Aneurysm of right and left iliac arteries s/p bilat EVAR placements  s/p bilat cataracts  s/p tonsils/addenoids as child      Allergies  No Known Allergies      Review of Systems: denies all but a bit confused, answers unreliable  CONSTITUTIONAL: No fever, chills, or fatigue  EYES: No eye pain, visual disturbances, or discharge  ENMT:  No difficulty hearing, tinnitus, vertigo; No sinus or throat pain  NECK: No pain or stiffness  RESPIRATORY: No cough, wheezing, chills or hemoptysis; No shortness of breath  CARDIOVASCULAR: No chest pain, palpitations, dizziness, or leg swelling  GASTROINTESTINAL: No abdominal or epigastric pain. No nausea, vomiting, or hematemesis; No diarrhea or constipation. No melena or hematochezia.  GENITOURINARY: No dysuria, frequency, hematuria, or incontinence  NEUROLOGICAL: No headaches, memory loss, loss of strength, numbness, or tremors  SKIN: No itching, burning, rashes, or lesions   MUSCULOSKELETAL: No joint pain or swelling; No muscle, back, or extremity pain  PSYCHIATRIC: No depression, anxiety, mood swings, or difficulty sleeping      Medications:  acetaminophen   IVPB .. 1000 milliGRAM(s) IV Intermittent once PRN  gabapentin 100 milliGRAM(s) Oral three times a day  HYDROmorphone  Injectable 0.5 milliGRAM(s) IV Push every 4 hours PRN  ondansetron Injectable 4 milliGRAM(s) IV Push every 6 hours PRN    lactated ringers Bolus 1000 milliLiter(s) IV Bolus once  lactated ringers. 1000 milliLiter(s) IV Continuous <Continuous>    lidocaine   4% Patch 1 Patch Transdermal daily        ICU Vital Signs Last 24 Hrs  HR: 73 (14 Mar 2024 14:53) (73 - 73)  BP: 106/47 (14 Mar 2024 14:53) (106/47 - 106/47)  RR: 24 (14 Mar 2024 14:53) (24 - 24)  SpO2: 92% (14 Mar 2024 14:53) (92% - 92%)    O2 Parameters below as of 14 Mar 2024 14:53  Patient On (Oxygen Delivery Method): room air      LABS:        140  |  110<H>  |  53<H>  ----------------------------<  109<H>  4.7   |  21<L>  |  2.35<H>    Ca    8.6      14 Mar 2024 15:40  Phos  5.6     03-  Mg     2.4     03-14    TPro  5.6<L>  /  Alb  3.2<L>  /  TBili  1.7<H>  /  DBili  x   /  AST  74<H>  /  ALT  46  /  AlkPhos  104  03-14      CARDIAC MARKERS ( 14 Mar 2024 15:40 )  x     / x     / 2106 U/L / x     / x        PT/INR - ( 14 Mar 2024 15:40 )   PT: 14.2 sec;   INR: 1.27 ratio   PTT - ( 14 Mar 2024 15:40 )  PTT:25.5 sec    Urinalysis Basic - ( 14 Mar 2024 16:25 )  Color: Yellow / Appearance: Clear / S.020 / pH: x  Gluc: x / Ketone: Negative mg/dL  / Bili: Negative / Urobili: normal mg/dL   Blood: x / Protein: Negative mg/dL / Nitrite: Negative   Leuk Esterase: Negative / RBC: 38 /HPF / WBC 8 /HPF   Sq Epi: x / Non Sq Epi: 1 /HPF / Bacteria: Negative /HPF      CULTURES: pending      Physical Examination:  General: Pleasant, pale appearing, No acute distress.  Alert, oriented, interactive, nonfocal  HEENT: Pupils equal, reactive to light.  Symmetric.  PULM: Clear to auscultation bilaterally, no significant sputum production  CVS: Regular rate and rhythm, no murmurs, rubs, or gallops  ABD: Soft, nondistended, nontender, normoactive bowel sounds, no masses  EXT: No edema, nontender except left hip area, mild tender to palpate, no obvious bruising/hematoma  SKIN: Warm and well perfused, no rashes noted.    RADIOLOGY:   CT Chest/Abdomen/Pelvis done 3/14:  IMPRESSION:  1- Markedly comminuted left acetabular/left iliac bone fracture.  2- Moderate amount of hemorrhage in the left retroperitoneum, left pelvis and left iliopsoas muscle.  3- Nondisplaced fracture of the left inferior pubic ramus.    CT Head/Cspine done 3/14:  1- CT HEAD: Mild periventricular white matter ischemia.    Moderate temporal   lobe atrophy.  2- CT cervical spine:    a- No vertebral fracture is recognized.    b- Moderate to severe degenerative disc disease and spondylosis at C4-5 through C7-T1   with narrowing of the BILATERAL C3-4 through C7-T1 neural foramina due to   uncovertebral spurring and facet osteophytic hypertrophy.   c- Marked RIGHT C2-3 facet osteophytic hypertrophy is also noted.  d- Posterior osteophytic ridge/disc complexes at C4-5 through C7-T1 abut the ventral cord.      CRITICAL CARE TIME SPENT: 75

## 2024-03-14 NOTE — ED ADULT NURSE NOTE - CHIEF COMPLAINT QUOTE
2023    Tu Multani MD  [address] [facility]    Patient: Lisette Brantley   YOB: 1980   Date of Visit: 2023     Encounter Diagnosis     ICD-10-CM    1  Lumbar radiculopathy  M54 16           Dear Dr Sly Dale:    Thank you for your recent referral of Lisette Brantley  Please review the attached evaluation summary from Olesya's recent visit  Please verify that you agree with the plan of care by signing the attached order  If you have any questions or concerns, please do not hesitate to call  I sincerely appreciate the opportunity to share in the care of one of your patients and hope to have another opportunity to work with you in the near future  Sincerely,    Anthony Buckley, PT      Referring Provider:      I certify that I have read the below Plan of Care and certify the need for these services furnished under this plan of treatment while under my care  Tu Multani MD  [address], [address]  [address]          PT Evaluation     Today's date: 2023  Patient name: Lisette Brantley  : 1980  MRN: [id number]  Referring provider: Fadi Al MD  Dx:   Encounter Diagnosis     ICD-10-CM    1  Lumbar radiculopathy  M54 16                      Assessment  Assessment details: 2023: Pt presents with constant L groin,calf pain and intermittent back/thigh pain signs and symptoms present for ,ptr than 16 days, are below to the knee which are not changing much in the past year and are consistent with derangement which would benefit from directional specific mechanical correction exercises  Symptoms respond with centralization to L buttock from L lateral knee upon mechanical assessment to determine directional preference of exension   Pt will benefit from skilled physical therapy 1-2X/week for 8-12 weeks to address deficits in range of motion, strength and function and return to PLOF by reaching pt BIBEMS form home s/p unwitnessed fall at 0700 this morning. pt with hx dementia, private aide reports pt will normally get up and walk around but did not today, so she called EMS. pt awake and alert in triage denies pain at this time. pt able to move all extremities. MD Pimentel in triage. neuro alert called [phone]. short and long term goals  Impairments: abnormal or restricted ROM, activity intolerance, lacks appropriate home exercise program, pain with function, poor posture  and poor body mechanics  Barriers to therapy: High co pay  Understanding of Dx/Px/POC: good   Prognosis: good    Goals  Short Tem Goals to be met in 4 weeks (target date 5/4/2023)  1  Pt to be independent w/ HEP  2  Reduce c/o pain to 0-4/10 with activity and prolonged positions  3  Restore lumbar AROM to [facility] w/o pain  4  Pt to report pain to be intermittent vs current constant  Long Term Goal to be met in 12 weeks (target date 6/29/2023)  1  Pt to achieve full symptom prevention/resolution via HEP  2  Pt to resume PLOF regarding ADL's and IADL's, improving FOTO score to 59 or better and pain of 0-2/10  3  Pt to report sleep no longer disturbed by pain  4  Pt to demonstrate B/L LE strength of 5/5 to allow squatting/lifting w/ good mechanics  Plan  Patient would benefit from: skilled speech therapy  Planned modality interventions: traction, unattended electrical stimulation and thermotherapy: hydrocollator packs  Planned therapy interventions: joint mobilization, manual therapy, abdominal trunk stabilization, patient education, postural training, stretching, transfer training, home exercise program and strengthening  Frequency: 1-2x/week  Plan of Care beginning date: 4/6/2023  Plan of Care expiration date: 6/29/2023  Treatment plan discussed with: patient        Subjective Evaluation    History of Present Illness  Mechanism of injury: Subjective 4/6/2023: Pt is a  for [employer]  She does a lot of standing, lifting and carrying  Her back pain began about a year ago, in her L calf  It later spread to the back of her thigh and buttock  Then it spread to her groin  Recently it has spread across her low back  Recently the groin pain and calf pain are constant, and back/posterior thigh pain are intermittent   She denies R LE symptoms other than intermittent R toes paresthesias  Pain is worse after prolonged sitting  She cannot sleep on her side, so her sleep is disturbed if she tries to roll off her back  She reports less pain after standing for a while  Pain  At best pain rating: 10  At worst pain ratin  Quality: burning, sharp and dull ache (paresthesias)  Relieving factors: medications  Progression: improved (only w/ medication)    Social Support    Employment status: working ([employer] manager)  Treatments  Previous treatment: chiropractic (no better)  Current treatment: physical therapy  Patient Goals  Patient goals for therapy: decreased pain, independence with ADLs/IADLs and increased motion  Patient goal: sleep w/o waking due to pain        Objective  SPECIAL TESTS     2023  SLR supine:   (- R/+ L)  Crossed SLR:   (- R/- L)  Prone instability test:  (-)  Prone hip IR ROM:  (-r/+L)  Aberrant motion/Niya's: (+)  Supine to sit test:  (-)  Buchanan test prone:  (-)  Slump test:   (-R/+L)  Femoral NTT:   (-R/-L)      DERMATOMAL TESTING: pinprick     2023  L2 anterior thigh:  WNL B/L  L3 medial knee:  WNL B/L  L4 medial maleolus:  Dec L  L5 1st web space: WNL B/L  S1 lateral/sole foot:  Dec L  S2 poster calf:  Dec L  Hip exten:  R 4+/5; L 3+/5    MYOTOMAL TESTIN2023     Right  Left  L2-3 Hip flexion:  5  4/5*  L3-4 Knee extension:  5  4+/5  L5 Great toe exten:  5  5/5  L4 Heel walk/DF:  5  5/  S1 toe walk/PF/ever:    5/  S2 knee flex:   5  5/5    REFLEXES: 0= none; 1+ = slight; 2+ = brisk/normal; 3+= very brisk; 4+= clonus     2023  L3-4 Quadriceps:  1+ b/l  L5-S1 Achilles:  1+ b/l    SPINAL/PIAVM ASSESSMENT/PALPATION:   2023: Lumbar P to A segmental mobility min/mod limited w/o pain       LUMBAR AROM: 2023  Flexion   Mod* oscar  Extension  mod oscar  R sideglide  min oscar  L sideglide  min oscar    MECHANICAL ASSESSMENT:   2023: pre-test findings include 6/10 L groin and lateral knee pain  Repeated extension in lying (REIL) 2x10 = centralizing to L buttock/B 3-4/10  Repeated EIL from qped 1x10 =     FUNCTION:  4/6/2023: Pt is limited with tolerance to sitting and sleep is disturbed by pain  Access Code: [email or web address]  URL: [email or web address]  Date: 04/06/2023  Prepared by: Anat Sage    Exercises  - Quadruped Hip Drops  - 6 x daily - 10 reps    Precautions: History reviewed  No pertinent past medical history     SOC: 4/6/2023  FOTO: 4/6/2023  POC EXP 6/29/2023  DAILY TREATMENT LOG    Manuals 4/6/2023                                       Neuro Re-Ed        REIL 2X10       EIL from qped 1x10 (prefers)       PAWEL w/ B/L knee flexion 2x10       bridges        pirif stretch        Sciatic NG supine        Pt educ lumbar roll; importance of HEP freq, limit flexed postures; disc mechanics TT       Ther Ex                                                                        Ther Activity                        Gait Training                        Modalities

## 2024-03-14 NOTE — H&P ADULT - NSHPPHYSICALEXAM_GEN_ALL_CORE
PHYSICAL EXAM:  GENERAL: NAD, AOx3, well developed  HEAD:  Atraumatic, Normocephalic  EYES: EOMI, conjunctiva and sclera clear  NECK: Supple, No JVD  CHEST/LUNG: Unlabored respirations b/l  HEART: S1 and S2 normal  ABDOMEN: soft, nondistended, tender at lower abdomen  EXTREMITIES:  2+ Peripheral Pulses  NERVOUS SYSTEM:  AO X3, speech clear. No deficits   MSK: weak left lower extremity range of motion  SKIN: warm to touch. No rashes or lesions

## 2024-03-15 DIAGNOSIS — I25.10 ATHEROSCLEROTIC HEART DISEASE OF NATIVE CORONARY ARTERY WITHOUT ANGINA PECTORIS: ICD-10-CM

## 2024-03-15 DIAGNOSIS — R79.89 OTHER SPECIFIED ABNORMAL FINDINGS OF BLOOD CHEMISTRY: ICD-10-CM
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
## 2024-03-15 PROBLEM — E78.5 HYPERLIPIDEMIA, UNSPECIFIED: Chronic | Status: ACTIVE | Noted: 2017-04-21

## 2024-03-15 PROBLEM — I10 ESSENTIAL (PRIMARY) HYPERTENSION: Chronic | Status: ACTIVE | Noted: 2017-04-21

## 2024-03-15 LAB
ALBUMIN SERPL ELPH-MCNC: 3.1 G/DL — LOW (ref 3.3–5)
ALP SERPL-CCNC: 98 U/L — SIGNIFICANT CHANGE UP (ref 40–120)
ALT FLD-CCNC: 46 U/L — SIGNIFICANT CHANGE UP (ref 12–78)
ANION GAP SERPL CALC-SCNC: 7 MMOL/L — SIGNIFICANT CHANGE UP (ref 5–17)
APTT BLD: 24.6 SEC — SIGNIFICANT CHANGE UP (ref 24.5–35.6)
AST SERPL-CCNC: 85 U/L — HIGH (ref 15–37)
BILIRUB SERPL-MCNC: 1.7 MG/DL — HIGH (ref 0.2–1.2)
BUN SERPL-MCNC: 57 MG/DL — HIGH (ref 7–23)
CALCIUM SERPL-MCNC: 7.9 MG/DL — LOW (ref 8.5–10.1)
CHLORIDE SERPL-SCNC: 111 MMOL/L — HIGH (ref 96–108)
CK SERPL-CCNC: 2006 U/L — HIGH (ref 26–308)
CO2 SERPL-SCNC: 23 MMOL/L — SIGNIFICANT CHANGE UP (ref 22–31)
CREAT SERPL-MCNC: 2.17 MG/DL — HIGH (ref 0.5–1.3)
CULTURE RESULTS: NO GROWTH — SIGNIFICANT CHANGE UP
EGFR: 28 ML/MIN/1.73M2 — LOW
GLUCOSE SERPL-MCNC: 115 MG/DL — HIGH (ref 70–99)
HCT VFR BLD CALC: 22.6 % — LOW (ref 39–50)
HCT VFR BLD CALC: 25.4 % — LOW (ref 39–50)
HCT VFR BLD CALC: 25.5 % — LOW (ref 39–50)
HCT VFR BLD CALC: 26.2 % — LOW (ref 39–50)
HGB BLD-MCNC: 7.4 G/DL — LOW (ref 13–17)
HGB BLD-MCNC: 8.4 G/DL — LOW (ref 13–17)
HGB BLD-MCNC: 8.5 G/DL — LOW (ref 13–17)
HGB BLD-MCNC: 8.5 G/DL — LOW (ref 13–17)
INR BLD: 1.12 RATIO — SIGNIFICANT CHANGE UP (ref 0.85–1.18)
LACTATE SERPL-SCNC: 1.6 MMOL/L — SIGNIFICANT CHANGE UP (ref 0.7–2)
MAGNESIUM SERPL-MCNC: 2.5 MG/DL — SIGNIFICANT CHANGE UP (ref 1.6–2.6)
MCHC RBC-ENTMCNC: 26.6 PG — LOW (ref 27–34)
MCHC RBC-ENTMCNC: 26.8 PG — LOW (ref 27–34)
MCHC RBC-ENTMCNC: 27.1 PG — SIGNIFICANT CHANGE UP (ref 27–34)
MCHC RBC-ENTMCNC: 27.4 PG — SIGNIFICANT CHANGE UP (ref 27–34)
MCHC RBC-ENTMCNC: 32.4 GM/DL — SIGNIFICANT CHANGE UP (ref 32–36)
MCHC RBC-ENTMCNC: 32.7 GM/DL — SIGNIFICANT CHANGE UP (ref 32–36)
MCHC RBC-ENTMCNC: 32.9 GM/DL — SIGNIFICANT CHANGE UP (ref 32–36)
MCHC RBC-ENTMCNC: 33.5 GM/DL — SIGNIFICANT CHANGE UP (ref 32–36)
MCV RBC AUTO: 81.3 FL — SIGNIFICANT CHANGE UP (ref 80–100)
MCV RBC AUTO: 81.9 FL — SIGNIFICANT CHANGE UP (ref 80–100)
MCV RBC AUTO: 82.3 FL — SIGNIFICANT CHANGE UP (ref 80–100)
MCV RBC AUTO: 82.6 FL — SIGNIFICANT CHANGE UP (ref 80–100)
PHOSPHATE SERPL-MCNC: 5 MG/DL — HIGH (ref 2.5–4.5)
PLATELET # BLD AUTO: 107 K/UL — LOW (ref 150–400)
PLATELET # BLD AUTO: 108 K/UL — LOW (ref 150–400)
PLATELET # BLD AUTO: 110 K/UL — LOW (ref 150–400)
PLATELET # BLD AUTO: 129 K/UL — LOW (ref 150–400)
POTASSIUM SERPL-MCNC: 4.1 MMOL/L — SIGNIFICANT CHANGE UP (ref 3.5–5.3)
POTASSIUM SERPL-SCNC: 4.1 MMOL/L — SIGNIFICANT CHANGE UP (ref 3.5–5.3)
PROT SERPL-MCNC: 5.8 GM/DL — LOW (ref 6–8.3)
PROTHROM AB SERPL-ACNC: 12.6 SEC — SIGNIFICANT CHANGE UP (ref 9.5–13)
RBC # BLD: 2.78 M/UL — LOW (ref 4.2–5.8)
RBC # BLD: 3.1 M/UL — LOW (ref 4.2–5.8)
RBC # BLD: 3.1 M/UL — LOW (ref 4.2–5.8)
RBC # BLD: 3.17 M/UL — LOW (ref 4.2–5.8)
RBC # FLD: 17.1 % — HIGH (ref 10.3–14.5)
RBC # FLD: 17.2 % — HIGH (ref 10.3–14.5)
RBC # FLD: 17.3 % — HIGH (ref 10.3–14.5)
RBC # FLD: 17.7 % — HIGH (ref 10.3–14.5)
SODIUM SERPL-SCNC: 141 MMOL/L — SIGNIFICANT CHANGE UP (ref 135–145)
SPECIMEN SOURCE: SIGNIFICANT CHANGE UP
T3 SERPL-MCNC: 40 NG/DL — LOW (ref 80–200)
T4 AB SER-ACNC: 6.6 UG/DL — SIGNIFICANT CHANGE UP (ref 4.6–12)
T4 FREE SERPL-MCNC: 1.19 NG/DL — SIGNIFICANT CHANGE UP (ref 0.76–1.46)
TROPONIN I, HIGH SENSITIVITY RESULT: 3223.96 NG/L — HIGH
WBC # BLD: 11.15 K/UL — HIGH (ref 3.8–10.5)
WBC # BLD: 8.71 K/UL — SIGNIFICANT CHANGE UP (ref 3.8–10.5)
WBC # BLD: 9.83 K/UL — SIGNIFICANT CHANGE UP (ref 3.8–10.5)
WBC # BLD: 9.91 K/UL — SIGNIFICANT CHANGE UP (ref 3.8–10.5)
WBC # FLD AUTO: 11.15 K/UL — HIGH (ref 3.8–10.5)
WBC # FLD AUTO: 8.71 K/UL — SIGNIFICANT CHANGE UP (ref 3.8–10.5)
WBC # FLD AUTO: 9.83 K/UL — SIGNIFICANT CHANGE UP (ref 3.8–10.5)
WBC # FLD AUTO: 9.91 K/UL — SIGNIFICANT CHANGE UP (ref 3.8–10.5)

## 2024-03-15 PROCEDURE — 99291 CRITICAL CARE FIRST HOUR: CPT

## 2024-03-15 PROCEDURE — 99232 SBSQ HOSP IP/OBS MODERATE 35: CPT

## 2024-03-15 PROCEDURE — 99223 1ST HOSP IP/OBS HIGH 75: CPT

## 2024-03-15 PROCEDURE — 72170 X-RAY EXAM OF PELVIS: CPT | Mod: 26

## 2024-03-15 RX ORDER — FUROSEMIDE 40 MG
20 TABLET ORAL ONCE
Refills: 0 | Status: COMPLETED | OUTPATIENT
Start: 2024-03-15 | End: 2024-03-15

## 2024-03-15 RX ORDER — ATORVASTATIN CALCIUM 80 MG/1
40 TABLET, FILM COATED ORAL AT BEDTIME
Refills: 0 | Status: DISCONTINUED | OUTPATIENT
Start: 2024-03-15 | End: 2024-03-21

## 2024-03-15 RX ADMIN — ATORVASTATIN CALCIUM 40 MILLIGRAM(S): 80 TABLET, FILM COATED ORAL at 21:07

## 2024-03-15 RX ADMIN — DESMOPRESSIN ACETATE 220 MICROGRAM(S): 0.1 TABLET ORAL at 00:27

## 2024-03-15 RX ADMIN — LIDOCAINE 1 PATCH: 4 CREAM TOPICAL at 19:20

## 2024-03-15 RX ADMIN — Medication 0.5 MILLIGRAM(S): at 14:24

## 2024-03-15 RX ADMIN — GABAPENTIN 100 MILLIGRAM(S): 400 CAPSULE ORAL at 21:07

## 2024-03-15 RX ADMIN — LIDOCAINE 1 PATCH: 4 CREAM TOPICAL at 21:05

## 2024-03-15 RX ADMIN — Medication 20 MILLIGRAM(S): at 10:58

## 2024-03-15 RX ADMIN — LIDOCAINE 1 PATCH: 4 CREAM TOPICAL at 10:59

## 2024-03-15 NOTE — CONSULT NOTE ADULT - ASSESSMENT
HPI: Pt is a 89y old Male who presents s/p unwitnessed fall. Initially hypotensive w/ MAPs 60s and rectal T 94.4F in trauma bay .CTA was conducted which revealed a retroperitoneal/pelvic hematoma without extravasation as well as a L acetabular fx.  Pt subjective limited 2/2 mental status, currently AOx2 and does not remember events. Denying numbness/tingling or pain anywhere other than L hip at this time. ED spoke to family at bedside who reported unwitnessed fall and baseline ambulatory status of minimal ambulator w/ a walker. States inability to walk immediately following the injury. Pt has sustained Comminuted left acetabular fx w/ slight intrapelvic shift of the femoral head in relation to contralateral side. No other acute obvious fxs appreciated. Ortho eval noted. Palliative Medicne Consult to further establish GOC  3/15/24 Seen and examined at bedside. Alert however disoriented to time and place. Endorses knee pain R/T arthritis.     Assessment and Plan:    1) S/P Fall  -CTA CAP: Comminuted left acetabular fx w/ slight intrapelvic shift of the femoral head in relation to contralateral side  -XR Pelvis + L Hip/Fem/Knee/Tib/Ankle: Acetabular fx as mentioned  -Ortho eval noted  -Pain management with Dilaudid 0.5 mg IVP Q4H PRN  -PT/OT when tolerable  -Ice as tolerated    2) AMS  -? baseline dementia  -Delirium R/T hospitalization/trauma  -Supportive care    3) CAD  -H/O CABG  -Cardio eval    4) Advanced Directives  -Pt without capacity  -Daughter surrogate  -MOLST DNR/DNI on chart  -Will discuss GOC after options discussed with family    Time Spent: 75 minutes including the care, coordination and counseling of this patient, 50% of which was spent coordinating and counseling.

## 2024-03-15 NOTE — PROGRESS NOTE ADULT - SUBJECTIVE AND OBJECTIVE BOX
89 yo M presenting s/p fall    Patient seen and examined on routine rounds.   He denies nausea, vomiting, fever or chills. Pain well controlled   S/p 2 units pRBC  Nurse report no acute event overnight   VS reviewed    Vital Signs Last 24 Hrs  T(C): 36 (15 Mar 2024 03:00), Max: 36 (14 Mar 2024 21:00)  T(F): 96.8 (15 Mar 2024 03:00), Max: 96.8 (14 Mar 2024 21:00)  HR: 88 (15 Mar 2024 03:00) (63 - 88)  BP: 123/59 (15 Mar 2024 03:00) (100/81 - 145/66)  BP(mean): 78 (15 Mar 2024 03:00) (74 - 105)  RR: 17 (15 Mar 2024 03:00) (14 - 32)  SpO2: 98% (15 Mar 2024 03:00) (92% - 100%)    Parameters below as of 14 Mar 2024 23:00  Patient On (Oxygen Delivery Method): room air      TERTIARY SURVEY   GCS of 15  Airway is patent  Breathing is symmetric and unlabored  Neuro: CNII-XII grossly intact  Psych: normal affect  HEENT: Normocephalic, atraumatic, BOINTA, EOM wnl, no otorrhea or hemotympanum b/l, no epistaxis or d/c b/l nares, no craniofacial bony pathology or tenderness b/l  Neck: Pt in hard cervical collar at time of exam. No crepitus, no ecchymosis, no hematoma, to exam, no JVD, no tracheal deviation  Cspine/thoracolumbrosacral spine: no gross bony pathology or tenderness to exam  Cardiovascular: S1S2 Present  Chest: no gross rib pathology or tenderness to exam. No sternal pathology or tenderness to exam. No crepitus, no ecchymosis, no hematoma. No penetrating thorcoabdominal trauma  Respiratory: Rate is 18; Respiratory Effort normal; no wheezes, rales or rhonchi to exam  ABD: bowel sounds (+), soft, LLQ tenderness, non distended, no rebound, no guarding, no rigidity, no skin changes to exam. L hip tenderness   Rectal: anal sphincter tone normal, guiac negative  Genitourinary: No scrotal/perineal/perirectal hematoma/ecchymosis/tenderness to exam  External genitalia: normal, no blood at urethral meatus. Nagel in situ   Musculoskeletal: L leg with acewrap in traction, limited ROM.  Pt has palpable b/l radial, femoral, dorsalis pedis pulses. All digits are warm and well perfused.  Pt demonstrates grossly intact sensoromotor function. Pt has good capillary refill to digits, no calf edema or tenderness to exam.  Skin: multiple ecchymosis on b/l UE. superficial abrasion at R elbow    MEDICATIONS  (STANDING):  gabapentin 100 milliGRAM(s) Oral three times a day  lidocaine   4% Patch 1 Patch Transdermal daily    MEDICATIONS  (PRN):  acetaminophen   IVPB .. 1000 milliGRAM(s) IV Intermittent once PRN Temp greater or equal to 38C (100.4F), Mild Pain (1 - 3)  HYDROmorphone  Injectable 0.5 milliGRAM(s) IV Push every 4 hours PRN Severe Pain (7 - 10)  ondansetron Injectable 4 milliGRAM(s) IV Push every 6 hours PRN Nausea    I&O's Detail    14 Mar 2024 07:01  -  15 Mar 2024 04:29  --------------------------------------------------------  IN:    IV PiggyBack: 150 mL    PRBCs (Packed Red Blood Cells): 668 mL  Total IN: 818 mL    OUT:    Indwelling Catheter - Urethral (mL): 310 mL  Total OUT: 310 mL    Total NET: 508 mL                          8.5    11.15 )-----------( 129      ( 15 Mar 2024 00:14 )             25.4     03-14    140  |  110<H>  |  53<H>  ----------------------------<  109<H>  4.7   |  21<L>  |  2.35<H>    Ca    8.6      14 Mar 2024 15:40  Phos  5.6     03-14  Mg     2.4     03-14    TPro  5.6<L>  /  Alb  3.2<L>  /  TBili  1.7<H>  /  DBili  x   /  AST  74<H>  /  ALT  46  /  AlkPhos  104  03-14    < from: CT Abdomen and Pelvis w/ IV Cont (03.14.24 @ 16:03) >  IMPRESSION:    Markedly comminuted left acetabular/left iliac bone fracture.    Moderate amount of hemorrhage in the left retroperitoneum, left pelvis   and left iliopsoas muscle.    Nondisplaced fracture of the left inferior pubic ramus.    < end of copied text >  < from: CT Head No Cont (03.14.24 @ 16:02) >    IMPRESSION:    CT HEAD: Mild periventricular white matter ischemia.    Moderate temporal   lobe atrophy.    CT cervical spine:   No vertebral fracture is recognized.  Moderate to   severe degenerative disc disease and spondylosis at C4-5 through C7-T1   with narrowing of the BILATERAL C3-4 through C7-T1 neural foramina due to   uncovertebral spurring and facet osteophytic hypertrophy. Marked RIGHT   C2-3 facet osteophytic hypertrophy is also noted. Posterior osteophytic   ridge/disc complexes at C4-5 through C7-T1 abut the ventral cord.    < end of copied text >  < from: Xray Chest 1 View- PORTABLE-Urgent (Xray Chest 1 View- PORTABLE-Urgent .) (03.14.24 @ 15:47) >    INTERPRETATION:  AP chest on March 14, 2024 at 3:37 PM. A shunt trauma.    COMPARISON: None available.    Heart magnified by technique. Sternotomy is noted.    Lungs are clear. No fracture.    IMPRESSION: Sternotomy. No acute finding.    < end of copied text >     89 yo M presenting s/p fall    Patient seen and examined on routine rounds.   He denies nausea, vomiting, fever or chills. Pain well controlled   S/p 2 units pRBC  Nurse report no acute event overnight   VS reviewed    Vital Signs Last 24 Hrs  T(C): 36 (15 Mar 2024 03:00), Max: 36 (14 Mar 2024 21:00)  T(F): 96.8 (15 Mar 2024 03:00), Max: 96.8 (14 Mar 2024 21:00)  HR: 88 (15 Mar 2024 03:00) (63 - 88)  BP: 123/59 (15 Mar 2024 03:00) (100/81 - 145/66)  BP(mean): 78 (15 Mar 2024 03:00) (74 - 105)  RR: 17 (15 Mar 2024 03:00) (14 - 32)  SpO2: 98% (15 Mar 2024 03:00) (92% - 100%)    Parameters below as of 14 Mar 2024 23:00  Patient On (Oxygen Delivery Method): room air      TERTIARY SURVEY   GCS of 15  Airway is patent  Breathing is symmetric and unlabored  Neuro: AA ox3 intermittently, dementia at baseline, moving all 4, LLE in splint/cast  Psych: normal affect  HEENT: Normocephalic, atraumatic, BONITA, EOM wnl, no otorrhea or hemotympanum b/l, no epistaxis or d/c b/l nares, no craniofacial bony pathology or tenderness b/l  Neck: . No crepitus, no ecchymosis, no hematoma, to exam, , no tracheal deviation  Cspine/thoracolumbrosacral spine: no gross bony pathology or tenderness to exam  Cardiovascular: S1S2 Present  Chest: no gross rib pathology or tenderness to exam. No sternal pathology or tenderness to exam. No crepitus, no ecchymosis, no hematoma. No penetrating thorcoabdominal trauma  Respiratory: Rate is 18; Respiratory Effort normal; no wheezes, rales or rhonchi to exam  ABD: bowel sounds (+), soft, LLQ tenderness, non distended, no rebound, no guarding, no rigidity, no skin changes to exam. L hip tenderness   Genitourinary: No scrotal/perineal/perirectal hematoma/ecchymosis/tenderness to exam  External genitalia: normal, no blood at urethral meatus. Nagel in situ   Musculoskeletal: L leg with acewrap in traction, limited ROM.  Pt has palpable b/l radial, femoral, dorsalis pedis pulses. All digits are warm and well perfused.  Pt demonstrates grossly intact sensorimotor function. Pt has good capillary refill to digits, no calf edema or tenderness to exam.  Skin: multiple ecchymosis on b/l UE. superficial abrasion at R elbow    MEDICATIONS  (STANDING):  gabapentin 100 milliGRAM(s) Oral three times a day  lidocaine   4% Patch 1 Patch Transdermal daily    MEDICATIONS  (PRN):  acetaminophen   IVPB .. 1000 milliGRAM(s) IV Intermittent once PRN Temp greater or equal to 38C (100.4F), Mild Pain (1 - 3)  HYDROmorphone  Injectable 0.5 milliGRAM(s) IV Push every 4 hours PRN Severe Pain (7 - 10)  ondansetron Injectable 4 milliGRAM(s) IV Push every 6 hours PRN Nausea    I&O's Detail    14 Mar 2024 07:01  -  15 Mar 2024 04:29  --------------------------------------------------------  IN:    IV PiggyBack: 150 mL    PRBCs (Packed Red Blood Cells): 668 mL  Total IN: 818 mL    OUT:    Indwelling Catheter - Urethral (mL): 310 mL  Total OUT: 310 mL    Total NET: 508 mL                          8.5    11.15 )-----------( 129      ( 15 Mar 2024 00:14 )             25.4     03-14    140  |  110<H>  |  53<H>  ----------------------------<  109<H>  4.7   |  21<L>  |  2.35<H>    Ca    8.6      14 Mar 2024 15:40  Phos  5.6     03-14  Mg     2.4     03-14    TPro  5.6<L>  /  Alb  3.2<L>  /  TBili  1.7<H>  /  DBili  x   /  AST  74<H>  /  ALT  46  /  AlkPhos  104  03-14    < from: CT Abdomen and Pelvis w/ IV Cont (03.14.24 @ 16:03) >  IMPRESSION:    Markedly comminuted left acetabular/left iliac bone fracture.    Moderate amount of hemorrhage in the left retroperitoneum, left pelvis   and left iliopsoas muscle.    Nondisplaced fracture of the left inferior pubic ramus.    < end of copied text >  < from: CT Head No Cont (03.14.24 @ 16:02) >    IMPRESSION:    CT HEAD: Mild periventricular white matter ischemia.    Moderate temporal   lobe atrophy.    CT cervical spine:   No vertebral fracture is recognized.  Moderate to   severe degenerative disc disease and spondylosis at C4-5 through C7-T1   with narrowing of the BILATERAL C3-4 through C7-T1 neural foramina due to   uncovertebral spurring and facet osteophytic hypertrophy. Marked RIGHT   C2-3 facet osteophytic hypertrophy is also noted. Posterior osteophytic   ridge/disc complexes at C4-5 through C7-T1 abut the ventral cord.    < end of copied text >  < from: Xray Chest 1 View- PORTABLE-Urgent (Xray Chest 1 View- PORTABLE-Urgent .) (03.14.24 @ 15:47) >    INTERPRETATION:  AP chest on March 14, 2024 at 3:37 PM. A shunt trauma.    COMPARISON: None available.    Heart magnified by technique. Sternotomy is noted.    Lungs are clear. No fracture.    IMPRESSION: Sternotomy. No acute finding.    < end of copied text >

## 2024-03-15 NOTE — CONSULT NOTE ADULT - PROBLEM SELECTOR RECOMMENDATION 9
NSTEMI pt presenting after a fall found to have Hs trop 393, 1079, 3223.   ECG NSR nonspecific ST-T wave abnormality. No chest pain. in setting hx CAD. unclear if type II vs type I given acute blood loss anemia. Not currently a cath candidate given acute anemia due to left RP bleed, renal dysfunction and altered mental status   Recommend trend trop to peak with ecg  echocardiogram ordered  consider starting ASA when safe, cont statin

## 2024-03-15 NOTE — PROGRESS NOTE ADULT - ASSESSMENT
89 year old on aspirin s/p fall last night  with acetabular, rami fracture displaced with Acute blood loss anemia  hgb  5.9, after 1 unit blood on admission, got 3 unit total now 7.1  creatinine 2.3, no known kidney disease  JVD  dec lv function on POCUS  positive troponin  inc cpk possible rhabdomyolyiss    1. transfuse one more unit blood, I think bleeding has stoppped  2.  dilaudid prn pain  3. possible rhabdo will trend cpk  4/ concern over chf jvd, inc troponin, give dose Lasix,  with addition unit of blood  4. BALWINDER monitor, got contrast, slightly better, hold ace, start bblocker when bp stable  5. Paitent DNR/DNI, would need repair of acetabular frx, ? family wishes, will consult palliative care

## 2024-03-15 NOTE — CONSULT NOTE ADULT - PROBLEM SELECTOR RECOMMENDATION 3
c/b acetabular fracture.   unclear re family/ortho plan for surgery at this time.   From a cardiac standpoint he is high risk for procedure.

## 2024-03-15 NOTE — PROGRESS NOTE ADULT - SUBJECTIVE AND OBJECTIVE BOX
Patient is a 89y old  Male who presents with a chief complaint of unwitnessed fall (15 Mar 2024 00:12)      BRIEF HOSPITAL COURSE:   89M with PMHx CAD sp CABG, sp EVAR, bilateral iliac aneurysm repair, dementia, HTN, HLD, lumbar spinal fx sp fall prior who presented to ED from home after being found down on floor by aide. Time down unknown. Trauma workup revealed CT showing L acetabular fracture with inferior pubic rami fracture, no blush, noted L retroperitoneum hematoma. Initial CBC Hbg 5.9 received 1pRBC/1plt/1FFP on arrival in ED. Given DDAVP given his use o ASA. Transferred to ICU. Further tx'd 2 pRBCs.    Events last 24 hours: sp 2 additional pRBCs for total of 3 with Hbg up to 8.5, hemodynamics stable, trop still rising, no CP/SOB, abd pain noted, traction to LLE placed by ortho.    PAST MEDICAL & SURGICAL HISTORY:  HTN (hypertension)      Hyperlipidemia      CAD (coronary artery disease)      S/P CABG (coronary artery bypass graft)      H/O peripheral vascular disease      Aneurysm of right iliac artery          Review of Systems:  12 pt ROS negative unless otherwise stated above.    Medications:        acetaminophen   IVPB .. 1000 milliGRAM(s) IV Intermittent once PRN  gabapentin 100 milliGRAM(s) Oral three times a day  HYDROmorphone  Injectable 0.5 milliGRAM(s) IV Push every 4 hours PRN  ondansetron Injectable 4 milliGRAM(s) IV Push every 6 hours PRN        lidocaine   4% Patch 1 Patch Transdermal daily            ICU Vital Signs Last 24 Hrs  T(C): 36 (15 Mar 2024 03:00), Max: 36 (14 Mar 2024 21:00)  T(F): 96.8 (15 Mar 2024 03:00), Max: 96.8 (14 Mar 2024 21:00)  HR: 88 (15 Mar 2024 03:00) (63 - 88)  BP: 123/59 (15 Mar 2024 03:00) (100/81 - 145/66)  BP(mean): 78 (15 Mar 2024 03:00) (74 - 105)  ABP: --  ABP(mean): --  RR: 17 (15 Mar 2024 03:00) (14 - 32)  SpO2: 98% (15 Mar 2024 03:00) (92% - 100%)    O2 Parameters below as of 14 Mar 2024 23:00  Patient On (Oxygen Delivery Method): room air      I&O's Summary    14 Mar 2024 07:01  -  15 Mar 2024 03:33  --------------------------------------------------------  IN: 818 mL / OUT: 310 mL / NET: 508 mL      LABS:                        8.5    11.15 )-----------( 129      ( 15 Mar 2024 00:14 )             25.4     03-14    140  |  110<H>  |  53<H>  ----------------------------<  109<H>  4.7   |  21<L>  |  2.35<H>    Ca    8.6      14 Mar 2024 15:40  Phos  5.6     03-14  Mg     2.4     03-14    TPro  5.6<L>  /  Alb  3.2<L>  /  TBili  1.7<H>  /  DBili  x   /  AST  74<H>  /  ALT  46  /  AlkPhos  104  03-14      CARDIAC MARKERS ( 14 Mar 2024 19:43 )  x     / x     / 2097 U/L / x     / x      CARDIAC MARKERS ( 14 Mar 2024 15:40 )  x     / x     / 2106 U/L / x     / x          CAPILLARY BLOOD GLUCOSE        PT/INR - ( 14 Mar 2024 15:40 )   PT: 14.2 sec;   INR: 1.27 ratio         PTT - ( 14 Mar 2024 15:40 )  PTT:25.5 sec  Urinalysis Basic - ( 14 Mar 2024 16:25 )    Color: Yellow / Appearance: Clear / S.020 / pH: x  Gluc: x / Ketone: Negative mg/dL  / Bili: Negative / Urobili: normal mg/dL   Blood: x / Protein: Negative mg/dL / Nitrite: Negative   Leuk Esterase: Negative / RBC: 38 /HPF / WBC 8 /HPF   Sq Epi: x / Non Sq Epi: 1 /HPF / Bacteria: Negative /HPF          Physical Examination:    General: No acute distress.  Alert, oriented, interactive, nonfocal    HEENT: Pupils equal, reactive to light.  Symmetric.    PULM: Clear to auscultation bilaterally    CVS: Regular rate and rhythm    ABD: Soft, nondistended, nontender, normoactive bowel sounds    EXT: No edema, L hip area tender to palpation, traction on LLE in place, neurovasc intact    SKIN: Warm and well perfused    RADIOLOGY:   ACC: [id number] EXAM:  CT ABDOMEN AND PELVIS IC   ORDERED BY: JITENDRA GALARZA     ACC: [id number] EXAM:  CT CHEST IC   ORDERED BY: JITENDRA GALARZA     PROCEDURE DATE:  2024          INTERPRETATION:  CLINICAL INFORMATION: Trauma    COMPARISON: None.    CONTRAST/COMPLICATIONS:  IV Contrast: Omnipaque 350 (accession [id number]), IV contrast documented   in unlinked concurrent exam (accession [id number])  90 cc administered   0   cc discarded  Oral Contrast: NONE  Complications: None reported at time of study completion    PROCEDURE:  CT of the Chest, Abdomen and Pelvis was performed.  Imaging was performed through the chest in the arterial phase followed by   imaging of the abdomen and pelvis in the portal venous phase.  Sagittal and coronal reformats were performed.    FINDINGS:  CHEST:  LUNGS AND LARGE AIRWAYS: Patent central airways. No pulmonary nodules.  PLEURA: Trace bilateral pleural effusions.  VESSELS: Severe atherosclerotic arterial calcification, including diffuse   native coronary artery calcification and calcification of at least one of   the bypass grafts.  HEART: Cardiomegaly. No pericardial effusion. Aortic valve calcifications   which can be seen in the setting of aortic stenosis.  MEDIASTINUM AND BHUPENDRA: No lymphadenopathy.  CHEST WALL AND LOWER NECK: Within normal limits.    ABDOMEN AND PELVIS:  LIVER: Within normal limits.  BILE DUCTS: Normal caliber.  GALLBLADDER: Within normal limits.  SPLEEN: Within normal limits.  PANCREAS: Within normal limits.  ADRENALS: Within normal limits.  KIDNEYS/URETERS: Within normal limits.    BLADDER: Within normal limits.  REPRODUCTIVE ORGANS: Prostate within normal limits.    BOWEL: No bowel obstruction. Appendix is not visualized.  PERITONEUM: No ascites.  VESSELS: Status post aortobiiliac endograft and right internal iliac   artery embolization. Aneurysmal dilatation of the left common iliac   artery to 3.4 cm.  Two left internal iliac artery aneurysms measuring 2.3   and 1.6 cm.  RETROPERITONEUM/LYMPH NODES: Moderate amount of hemorrhage in the left   retroperitoneum, left iliopsoas muscle and in the extraperitoneal spaces   of the pelvis bilaterally. No active bleeding identified..  ABDOMINAL WALL: Within normal limits.  BONES: Markedly comminuted fracture of the left acetabulum and left   ilium. Nondisplaced fracture of the left inferior pubic ramus.   Nonspecific sclerotic lesion in the right ilium, most likely a bone   island. Age indeterminate severe L3 compression deformity. Sternotomy.    IMPRESSION:    Markedly comminuted left acetabular/left iliac bone fracture.    Moderate amount of hemorrhage in the left retroperitoneum, left pelvis   and left iliopsoas muscle.    Nondisplaced fracture of the left inferior pubic ramus.    Findings were discussed with Dr. Restrepo 3/14/2024 4:21 PM by Dr. Reis   with read back confirmation.    --- End of Report ---            PORSHA REIS MD; Attending Radiologist  This document has been electronically signed. Mar 14 2024  4:23PM    Time spent on this patient encounter, which includes documenting this note in the electronic medical record, was 36 minutes including assessing the presenting problems with associated risks, reviewing the medical record to prepare for the encounter, and meeting face to face with patient to obtain additional history. I have also performed an appropriate physical exam, made interventions listed and ordered and interpreted appropriate diagnostic studies as documented. To improve communication and patient saftey, I have coordinated care with the multidisciplinary team including the bedside nurse, appropriate attending of record and consultants as needed. Date of entry of note is equal to date of services rendered.

## 2024-03-15 NOTE — DIETITIAN INITIAL EVALUATION ADULT - PERTINENT MEDS FT
MEDICATIONS  (STANDING):  gabapentin 100 milliGRAM(s) Oral three times a day  lidocaine   4% Patch 1 Patch Transdermal daily    MEDICATIONS  (PRN):  acetaminophen   IVPB .. 1000 milliGRAM(s) IV Intermittent once PRN Temp greater or equal to 38C (100.4F), Mild Pain (1 - 3)  HYDROmorphone  Injectable 0.5 milliGRAM(s) IV Push every 4 hours PRN Severe Pain (7 - 10)  LORazepam   Injectable 0.5 milliGRAM(s) IV Push every 6 hours PRN Agitation  ondansetron Injectable 4 milliGRAM(s) IV Push every 6 hours PRN Nausea

## 2024-03-15 NOTE — PROGRESS NOTE ADULT - ASSESSMENT
Impression:  1. sp mechanical fall   2. pelvic fracture  3. acute blood loss anemia secondary to pelvic hemorrhage  4. BALWINDER  5. comminuted L acetabular fracture  6. rhabdomyolysis  7. elevated trop - ? NSTEMI    Plan:    Neuro - PRN analgesia multimodal, cautious dosing given BALWINDER              CTH negative for acute intracranial pathology    CV -  hemodynamics stable, NSR          lactate normalized          no CP          trop rising still, cont to trend          check Echo          AC and APT contraindicated given acute hemorrhage    Pulm -  stable on RA             no tachypnea, lungs clear    GI -  NPO overnight    Renal -Cr elevated, unclear baseline suspect CKD            CT no hydro             strict I/Os             avoid nephrotoxins             urine output has improved following tx but remains <0.5cc/kg, given POCUS showing diminished EF will hold on volume loading             avoid MAP<65             renally adjust all meds             CPK slow downtrend, cont to trend               Heme -  on AC or pharmacologic DVT PPx given acute hemorrhage/hematoma, SCDs, tx for Hbg<7 or signs of active bleeding, sp 3pRBC/1plt/1FFP and DDAVP.    ID - no indication for abx at this time, trend WBC and temp curve.     Endo - BS euglycemic, TSH mild elevation, repeat with free T4 in am.    GOC: DNR/DNI

## 2024-03-15 NOTE — PROGRESS NOTE ADULT - SUBJECTIVE AND OBJECTIVE BOX
SUBJECTIVE:       Pt seen and examined at bedside. No acute events overnight. Admitted to ICU, stable. Pt subjective limited 2/2 waxing/waning mental status. States he feels fine without any pain.    Vital Signs (24 Hrs):  T(C): 36.5 (03-15-24 @ 05:00), Max: 36.5 (03-15-24 @ 05:00)  HR: 74 (03-15-24 @ 05:00) (63 - 88)  BP: 98/56 (03-15-24 @ 05:00) (98/56 - 145/66)  RR: 21 (03-15-24 @ 05:00) (14 - 32)  SpO2: 94% (03-15-24 @ 05:00) (92% - 100%)  Wt(kg): --    LABS:                          8.5    11.15 )-----------( 129      ( 15 Mar 2024 00:14 )             25.4     03-14    140  |  110<H>  |  53<H>  ----------------------------<  109<H>  4.7   |  21<L>  |  2.35<H>    Ca    8.6      14 Mar 2024 15:40  Phos  5.6     03-14  Mg     2.4     03-14    TPro  5.6<L>  /  Alb  3.2<L>  /  TBili  1.7<H>  /  DBili  x   /  AST  74<H>  /  ALT  46  /  AlkPhos  104  03-14    LIVER FUNCTIONS - ( 14 Mar 2024 15:40 )  Alb: 3.2 g/dL / Pro: 5.6 gm/dL / ALK PHOS: 104 U/L / ALT: 46 U/L / AST: 74 U/L / GGT: x           PT/INR - ( 14 Mar 2024 15:40 )   PT: 14.2 sec;   INR: 1.27 ratio         PTT - ( 14 Mar 2024 15:40 )  PTT:25.5 sec    Gen: AOx2, Resting comfortably w/ Yudi hugger    LLE:  In [place] traction boot  Skin intact, no erythema or ecchymosis appreciated at area of injury  TTP throughout R hip and pelvis, otherwise NTTP  Moves toes  SILT toes 1-5  + DP, cap refill <2sec  Compartments soft and compressible  No calf tenderness    A/P: 89M w/ L comminuted acetabular fx  Admitted to ICU, appreciate primary care  Per conversation w/ trauma sx low concern for acute bleed given lack of extravasation on CTA - plan to monitor in ICU  If continued HD instability or further decompensates would recommend urgent IR consult for possible embolization  Family undecided re: surgical repair of acetabulum but leaning towards no sx - will follow up on decision  If surgical tx of fx is desired, will likely require transfer to level 1 trauma center for ORIF  Currently no acute ortho surgery/intervention indicated  Placed in [place] traction in the meantime for pain relief and improved hip positioning  NWB RLE  Multimodal analgesia  DVT ppx: per primary - recommend SCDs bilaterally in setting of hematoma  PT/OT when tolerable  Ice as tolerated  Discussed with attending Dr. Nair who is in agreement with above plan, will update as needed

## 2024-03-15 NOTE — PROGRESS NOTE ADULT - SUBJECTIVE AND OBJECTIVE BOX
Interval History:      Got 3 units prbc, hgb 7.4 this am      slightly confused      laying in bed      on nasal cannula      creatinine 2.1 slightly better      ck 2000 plateaued      troponin to 3000    HPI:       89 year old male who fell last night and then was found down today    PMH CAD, endovascular repairs in groing    no known kidney  disease, no heart failure known    In ED. Patient had MTP called, got 1, 1, 1  was on aspirin at home  CT showed left acetabular fracture, with inferior rami fracture, no blush on CTA hgb 5.9 after 1 unit blood got 2 units  initial creatinine was 2.3 no known hx. of kidney disease  Patient admitted to ICU  no initial cbc  hgb now 5.9   POCUS in ED - poor LV function     ROS some hip pain, no chest pain, no sob     MEDICATIONS  (STANDING):  furosemide   Injectable 20 milliGRAM(s) IV Push once  gabapentin 100 milliGRAM(s) Oral three times a day  lidocaine   4% Patch 1 Patch Transdermal daily    MEDICATIONS  (PRN):  acetaminophen   IVPB .. 1000 milliGRAM(s) IV Intermittent once PRN Temp greater or equal to 38C (100.4F), Mild Pain (1 - 3)  HYDROmorphone  Injectable 0.5 milliGRAM(s) IV Push every 4 hours PRN Severe Pain (7 - 10)  ondansetron Injectable 4 milliGRAM(s) IV Push every 6 hours PRN Nausea    Height (cm): 177.8 (03-14 @ 14:53)  Weight (kg): 65.8 (03-14 @ 14:53)  BMI (kg/m2): 20.8 (03-14 @ 14:53)    ICU Vital Signs Last 24 Hrs  T(C): 36.8 (15 Mar 2024 06:00), Max: 36.8 (15 Mar 2024 06:00)  T(F): 98.2 (15 Mar 2024 06:00), Max: 98.2 (15 Mar 2024 06:00)  HR: 84 (15 Mar 2024 06:00) (63 - 88)  BP: 117/71 (15 Mar 2024 06:00) (98/56 - 145/66)  BP(mean): 79 (15 Mar 2024 06:00) (70 - 105)  ABP: --  ABP(mean): --  RR: 18 (15 Mar 2024 06:00) (14 - 32)  SpO2: 97% (15 Mar 2024 06:00) (92% - 100%)    O2 Parameters below as of 14 Mar 2024 23:00  Patient On (Oxygen Delivery Method): room air    Physical Exam    general - pale  HEENT - nc/at  neck jvd  lungs clear  cv rrr  abdomen soft, lower abdomnal discomfort  extremits left leg externally rotated in traction   voiding    I&O's Summary    14 Mar 2024 07:01  -  15 Mar 2024 07:00  --------------------------------------------------------  IN: 818 mL / OUT: 409 mL / NET: 409 mL                        7.4    9.91  )-----------( 108      ( 15 Mar 2024 05:47 )             22.6       03-15    141  |  111<H>  |  57<H>  ----------------------------<  115<H>  4.1   |  23  |  2.17<H>    Ca    7.9<L>      15 Mar 2024 05:47  Phos  5.0     03-15  Mg     2.5     03-15    TPro  5.8<L>  /  Alb  3.1<L>  /  TBili  1.7<H>  /  DBili  x   /  AST  85<H>  /  ALT  46  /  AlkPhos  98  03-15      CARDIAC MARKERS ( 15 Mar 2024 05:47 )  x     / x     / 2006 U/L / x     / x      CARDIAC MARKERS ( 14 Mar 2024 19:43 )  x     / x     / 2097 U/L / x     / x      CARDIAC MARKERS ( 14 Mar 2024 15:40 )  x     / x     / 2106 U/L / x     / x        Urinalysis Basic - ( 15 Mar 2024 05:47 )    Color: x / Appearance: x / SG: x / pH: x  Gluc: 115 mg/dL / Ketone: x  / Bili: x / Urobili: x   Blood: x / Protein: x / Nitrite: x   Leuk Esterase: x / RBC: x / WBC x   Sq Epi: x / Non Sq Epi: x / Bacteria: x      DVT Prophylaxis:   held for bleeding                                                              Advanced Directives: DNR/DNI         Labs, Notes, Orders, radiologic studies reviewed and care coordinated with multidisciplinary team

## 2024-03-15 NOTE — CONSULT NOTE ADULT - CONVERSATION DETAILS
The role of Palliative Medicine was reviewed with the pt. Daughter is waiting to discuss surgical options prior to making a decision. Will follow

## 2024-03-15 NOTE — CONSULT NOTE ADULT - PROBLEM SELECTOR RECOMMENDATION 2
reported hx CAD s/p CABG. attempted to call all 3 children for further information but unable to reach.   denies current chest pain. not currently candidate for ischemic evaluation

## 2024-03-15 NOTE — DIETITIAN INITIAL EVALUATION ADULT - ORAL INTAKE PTA/DIET HISTORY
Lives at home w/ aides who assist w/ ADLs. Endorses "good" appetite at home, consumes 3 meals per day.  Lives at home w/ aides who assist w/ ADLs. Endorses "good" appetite at home, consumes 3 meals per day however ?accuracy as pt appeared somewhat confused upon assessment. Likely meeting <75% ENN.

## 2024-03-15 NOTE — DIETITIAN INITIAL EVALUATION ADULT - FACTORS AFF FOOD INTAKE
change in mental status/difficulty with food procurement/preparation/persistent lack of appetite/NPO

## 2024-03-15 NOTE — PROGRESS NOTE ADULT - ASSESSMENT
89yo M presents s/p unwitnessed fall with comminuted left acetabular/pelvic fracture with moderate hematoma in left retroperitoneum/pelvis/iliopsoas muscle      Per family discussion, discussed with daughter, family agrees with conservative treatment, but would not want any surgical intervention. verbal DNR/DNI    Plan:  -multimodal pain control prn  - Trend troponins, CBC. transfuse prn   - monitor VS  - cardiology consult  - diet: NPO  - continue IVF  - orthopedic surgery recs appreciated   - SCD for DVT PPX, hold pharmaceutical ppx and aspirin    Plan discussed with trauma surgery team and attending, Dr. Restrepo 89yo M presents s/p unwitnessed fall with comminuted left acetabular/pelvic fracture with moderate hematoma in left retroperitoneum/pelvis/iliopsoas muscle      Per family discussion, discussed with daughter, family agrees with conservative treatment, but would not want any surgical intervention. verbal DNR/DNI    Plan:  -multimodal pain control prn  - Trend troponins, CBC. transfuse prn   - monitor VS  - cardiology consult  - diet: NPO  - continue IVF  - orthopedic surgery recs appreciated   - SCD for DVT PPX, hold pharmaceutical ppx and aspirin    Plan discussed with trauma surgery team and attending, Dr. Collazo    Attending A/P:    Chart reviewed, patient examined, agree with above resident evaluation in addition to the following    no new injuries on tertiary, rising trop, elevated CK, CKD, slight drop in h/h this am, ortho, palliative, ICU on board, if family desires operative intervention will need transfer to [facility] 1 center, no extrav on CT but if continued drop then may consider IR    PLAN:  consult ICU, ortho, palliaitve, nephro, cardiology, ECHO  GI/DVT prophylaxis with SCD  home meds as appropriate  Pain control  PT, Spirometer      Pt will be monitored for signs of evolution/resolution of pathology and surgical intervention as required and warranted  Pt aware of and agrees with all of the above    35 minuted of time spent on pt examination, review of relevant labs and radiologic studies, assured stabilization of pt, discussion with relevant services/providers for coordination of pt care and services

## 2024-03-15 NOTE — PROGRESS NOTE ADULT - SUBJECTIVE AND OBJECTIVE BOX
Case reviewed.  Clinically improved, more alert.    ICU Vital Signs Last 24 Hrs  T(C): 36 (15 Mar 2024 00:00), Max: 36 (14 Mar 2024 21:00)  T(F): 96.8 (15 Mar 2024 00:00), Max: 96.8 (14 Mar 2024 21:00)  HR: 73 (15 Mar 2024 00:00) (63 - 84)  BP: 145/66 (15 Mar 2024 00:00) (100/81 - 145/66)  BP(mean): 85 (15 Mar 2024 00:00) (75 - 105)  RR: 16 (15 Mar 2024 00:00) (14 - 32)  SpO2: 96% (15 Mar 2024 00:00) (92% - 100%)    O2 Parameters below as of 14 Mar 2024 23:00  Patient On (Oxygen Delivery Method): room air      repeat labs pending.    Plan  continue care  trend troponin  trend Hg  transfuse as needed

## 2024-03-15 NOTE — DIETITIAN INITIAL EVALUATION ADULT - PERTINENT LABORATORY DATA
03-15    141  |  111<H>  |  57<H>  ----------------------------<  115<H>  4.1   |  23  |  2.17<H>    Ca    7.9<L>      15 Mar 2024 05:47  Phos  5.0     03-15  Mg     2.5     03-15    TPro  5.8<L>  /  Alb  3.1<L>  /  TBili  1.7<H>  /  DBili  x   /  AST  85<H>  /  ALT  46  /  AlkPhos  98  03-15

## 2024-03-15 NOTE — DIETITIAN INITIAL EVALUATION ADULT - OTHER INFO
89 y/o M w/ PMH of CABG and EVAR biliac aneurysm repair presents s/p unwitnessed fall this morning. Most of history obtained from daughter. Patient was found on floor, denies loss of conscious or head strike. CT demonstrated communited left acetabular/pelvic fracture with moderate hematoma in left retroperitoneum/pelvis/iliopsoas muscle. Tx to ICU for s/p fall with pelvic fracture and hematoma. Per family discussion, family agrees with conservative treatment, but would not want any surgical intervention. GOC: DNR/DNI.     Currently NPO. Ortho following - unlikely to undergo surgery as per family discussion. Palliative care also following - confirmed pt DNR/DNI, TF is not addressed in [facility] - please confirm. MTP called last night, received 3 units PRBC per intensivist note. UBW stated at 117# x 3 months ago however ?accuracy as pt appeared confused during RD assessment. Bed scale wt of 130# taken on 3/15/24. No wt hx to review in chart. NFPE reveals moderate to severe muscle/fat wasting - appeared very thin, frail. ADAT as per intensivist. When able, will add Ensure + HP shake BID to optimize nutritional needs (provides 350 kcal, 20g protein/ shake). Nutrition support is not recommended due to overall declining medical status which evidenced based studies indicate EN is not effective in prolonging survival and improving quality of life. It can also increase risk of aspiration pneumonia as well as other related issues (infection, GI complications, and worsening/ non-healing PI's). However, will provide nutrition/ hydration within GOC. Please see recs below.

## 2024-03-15 NOTE — CONSULT NOTE ADULT - SUBJECTIVE AND OBJECTIVE BOX
CHIEF COMPLAINT: fall            HPI:   Mr. Bravo is a 89 yo male with a hx CAD s/p CABG, PAD s/p EVAR iliac aneurysm repair presenting after an unwitnessed fall. Pt was found on floor; no reported LOC or head strike.   Currently confused and unable to provide additional information. He denies chest pain, SOB.     In the ED, he was hypotensive 92% on room air.   Labs notable for  hgb 5.9, WBC 10.75, plt 103, Cr 2.35, Hs trop 393, 1079, 3223.   ECG NSR nonspecific ST-T wave abnormality.   CTAP showed Markedly comminuted left acetabular/left iliac bone fracture, Moderate amount of hemorrhage in the left retroperitoneum, left pelvis and left iliopsoas muscle, Nondisplaced fracture of the left inferior pubic ramus.    Received 2 units prbc.         PAST MEDICAL / SURGICAL HISTORY:  PAST MEDICAL & SURGICAL HISTORY:  HTN (hypertension)      Hyperlipidemia      CAD (coronary artery disease)      S/P CABG (coronary artery bypass graft)      H/O peripheral vascular disease      Aneurysm of right iliac artery          SOCIAL HISTORY:   Alcohol: Denied  Smoking: Nonsmoker  Drug Use: Denied  Marital Status:     FAMILY HISTORY: FAMILY HISTORY:      MEDICATIONS  (STANDING):  gabapentin 100 milliGRAM(s) Oral three times a day  lidocaine   4% Patch 1 Patch Transdermal daily    MEDICATIONS  (PRN):  acetaminophen   IVPB .. 1000 milliGRAM(s) IV Intermittent once PRN Temp greater or equal to 38C (100.4F), Mild Pain (1 - 3)  HYDROmorphone  Injectable 0.5 milliGRAM(s) IV Push every 4 hours PRN Severe Pain (7 - 10)  LORazepam   Injectable 0.5 milliGRAM(s) IV Push every 6 hours PRN Agitation  ondansetron Injectable 4 milliGRAM(s) IV Push every 6 hours PRN Nausea      Allergies    No Known Allergies    Intolerances        REVIEW OF SYSTEMS:  CONSTITUTIONAL: No weakness, fevers or chills  Eyes: No visual changes  NECK: No pain or stiffness  RESPIRATORY: No cough, wheezing, hemoptysis; No shortness of breath  CARDIOVASCULAR: No chest pain or palpitations  GASTROINTESTINAL: No abdominal pain. No nausea, vomiting, or hematemesis; No diarrhea or constipation. No melena or hematochezia.  GENITOURINARY: No dysuria, frequency or hematuria  NEUROLOGICAL: No numbness.  SKIN: No itching or rash  All other review of systems is negative unless indicated above    VITAL SIGNS:   Vital Signs Last 24 Hrs  T(C): 37 (15 Mar 2024 13:00), Max: 37 (15 Mar 2024 13:00)  T(F): 98.6 (15 Mar 2024 13:00), Max: 98.6 (15 Mar 2024 13:00)  HR: 78 (15 Mar 2024 13:00) (63 - 88)  BP: 118/57 (15 Mar 2024 13:00) (98/56 - 145/66)  BP(mean): 75 (15 Mar 2024 13:00) (70 - 105)  RR: 14 (15 Mar 2024 13:00) (14 - 32)  SpO2: 96% (15 Mar 2024 13:00) (92% - 100%)    Parameters below as of 14 Mar 2024 23:00  Patient On (Oxygen Delivery Method): room air        I&O's Summary    14 Mar 2024 07:01  -  15 Mar 2024 07:00  --------------------------------------------------------  IN: 818 mL / OUT: 409 mL / NET: 409 mL        PHYSICAL EXAM:  Constitutional: NAD, awake and alert but confused   HEENT:  EOMI,  Pupils round, No oral cyanosis.  Pulmonary: Non-labored, breath sounds are clear bilaterally, No wheezing, rales or rhonchi  Cardiovascular: S1 and S2, regular rate and rhythm, no Murmurs, gallops or rubs  Gastrointestinal:  soft, nontender.   Lymph: No peripheral edema. No cervical lymphadenopathy.  Neurological: Alert,  disoriented  Skin: No rashes.       LABS:                        8.5    9.83  )-----------( 110      ( 15 Mar 2024 13:17 )             26.2                         7.4    9.91  )-----------( 108      ( 15 Mar 2024 05:47 )             22.6                         8.5    11.15 )-----------( 129      ( 15 Mar 2024 00:14 )             25.4     15 Mar 2024 05:47    141    |  111    |  57     ----------------------------<  115    4.1     |  23     |  2.17   14 Mar 2024 15:40    140    |  110    |  53     ----------------------------<  109    4.7     |  21     |  2.35     Ca    7.9        15 Mar 2024 05:47  Ca    8.6        14 Mar 2024 15:40  Phos  5.0       15 Mar 2024 05:47  Phos  5.6       14 Mar 2024 15:40  Mg     2.5       15 Mar 2024 05:47  Mg     2.4       14 Mar 2024 15:40    TPro  5.8    /  Alb  3.1    /  TBili  1.7    /  DBili  x      /  AST  85     /  ALT  46     /  AlkPhos  98     15 Mar 2024 05:47  TPro  5.6    /  Alb  3.2    /  TBili  1.7    /  DBili  x      /  AST  74     /  ALT  46     /  AlkPhos  104    14 Mar 2024 15:40    PT/INR - ( 14 Mar 2024 15:40 )   PT: 14.2 sec;   INR: 1.27 ratio         PTT - ( 14 Mar 2024 15:40 )  PTT:25.5 sec  CARDIAC MARKERS ( 15 Mar 2024 05:47 )  x     / x     / 2006 U/L / x     / x      CARDIAC MARKERS ( 14 Mar 2024 19:43 )  x     / x     / 2097 U/L / x     / x      CARDIAC MARKERS ( 14 Mar 2024 15:40 )  x     / x     / 2106 U/L / x     / x            03-14 @ 15:40  TSH: 5.82

## 2024-03-15 NOTE — DIETITIAN INITIAL EVALUATION ADULT - ADD RECOMMEND
1) ADAT per intensivist to regular   2) When able, will add Ensure + HP shake BID to optimize nutritional needs (provides 350 kcal, 20g protein/ shake)   3) Monitor bowel movements, if no BM for >3 days, consider implementing bowel regimen.   4) MVI w/ minerals daily to ensure 100% RDA met   5) Consider adding thiamine 100 mg daily 2/2 poor PO intake/ malnutrition   6) Monitor daily wt to track/trend changes; strict I/Os   7) Consider to obtain vitamin D 25OH level to assess nutriture. Also consider checking B6, B12, thiamine, folate, carnitine, and copper levels as malnutrition in cause these to be deficient   8) Confirm goals of care regarding nutrition support - palliative care following however TF not addressed in [facility]. Nutrition support is not recommended due to overall declining medical status which evidenced based studies indicate EN is not effective in prolonging survival and improving quality of life. It can also increase risk of aspiration pneumonia as well as other related issues (infection, GI complications, and worsening/ non-healing PI's). However, will provide nutrition/ hydration within GOC.   RD will continue to monitor PO intake, labs, hydration, and wt prn.

## 2024-03-15 NOTE — DIETITIAN INITIAL EVALUATION ADULT - NSFNSGIIOFT_GEN_A_CORE
I&O's Detail    14 Mar 2024 07:01  -  15 Mar 2024 07:00  --------------------------------------------------------  IN:    IV PiggyBack: 150 mL    PRBCs (Packed Red Blood Cells): 668 mL  Total IN: 818 mL    OUT:    Indwelling Catheter - Urethral (mL): 409 mL  Total OUT: 409 mL    Total NET: 409 mL

## 2024-03-15 NOTE — GOALS OF CARE CONVERSATION - ADVANCED CARE PLANNING - SURROGATE NAME
Pts daughter Sierra reports there is a HCP and think that it lists pts son and her but will bring in copy. Otherwise, all 3 children are surrogate decision makers and speak with one another

## 2024-03-15 NOTE — CONSULT NOTE ADULT - SUBJECTIVE AND OBJECTIVE BOX
HPI: Pt is a 89y old Male who presents s/p unwitnessed fall. Initially hypotensive w/ MAPs 60s and rectal T 94.4F in trauma bay .CTA was conducted which revealed a retroperitoneal/pelvic hematoma without extravasation as well as a L acetabular fx.  Pt subjective limited 2/2 mental status, currently AOx2 and does not remember events. Denying numbness/tingling or pain anywhere other than L hip at this time. ED spoke to family at bedside who reported unwitnessed fall and baseline ambulatory status of minimal ambulator w/ a walker. States inability to walk immediately following the injury. Pt has sustained Comminuted left acetabular fx w/ slight intrapelvic shift of the femoral head in relation to contralateral side. No other acute obvious fxs appreciated. Ortho eval noted. Palliative Medicne Consult to further establish GOC  3/15/24 Seen and examined at bedside. Alert however disoriented to time and place. Endorses knee pain R/T arthritis.         PAIN: (X )Yes   ( )No  Level: mild at rest  Location: knee  Intensity:   3 /10  Quality: ache  Aggravating Factors: movement    DYSPNEA: ( ) Yes  (X ) No  Level:    PAST MEDICAL & SURGICAL HISTORY:  HTN (hypertension)  Hyperlipidemia  CAD (coronary artery disease)  S/P CABG (coronary artery bypass graft)  H/O peripheral vascular disease  Aneurysm of right iliac artery    SOCIAL HX:  Lives alone with aide support  Hx opiate tolerance ( )YES  (X )NO    Baseline ADLs  (Prior to Admission)  ( X) Independent   ( )Dependent    FAMILY HISTORY:      Review of Systems:  Unable to obtain/Limited due to: AMS      PHYSICAL EXAM:    Vital Signs Last 24 Hrs  T(C): 36.8 (15 Mar 2024 09:45), Max: 36.9 (15 Mar 2024 07:00)  T(F): 98.2 (15 Mar 2024 09:45), Max: 98.4 (15 Mar 2024 07:00)  HR: 77 (15 Mar 2024 09:45) (63 - 88)  BP: 116/59 (15 Mar 2024 09:45) (98/56 - 145/66)  BP(mean): 75 (15 Mar 2024 09:45) (70 - 105)  RR: 23 (15 Mar 2024 09:45) (14 - 32)  SpO2: 93% (15 Mar 2024 09:45) (92% - 100%)  Parameters below as of 14 Mar 2024 23:00  Patient On (Oxygen Delivery Method): room air    PPSV2: 20-30  %    General: Elderly male in bed in mild distress  Mental Status: Disoriented to time and place  HEENT: oral mucosa dry  Lungs: lungs clear to auscultation  Cardiac: S1S2+  GI: abd soft NT ND + BS  : voids  Ext: moves on bed  Neuro: disoriented      LABS:                        7.4    9.91  )-----------( 108      ( 15 Mar 2024 05:47 )             22.6     03-15    141  |  111<H>  |  57<H>  ----------------------------<  115<H>  4.1   |  23  |  2.17<H>    Ca    7.9<L>      15 Mar 2024 05:47  Phos  5.0     03-15  Mg     2.5     03-15    TPro  5.8<L>  /  Alb  3.1<L>  /  TBili  1.7<H>  /  DBili  x   /  AST  85<H>  /  ALT  46  /  AlkPhos  98  03-15    PT/INR - ( 14 Mar 2024 15:40 )   PT: 14.2 sec;   INR: 1.27 ratio         PTT - ( 14 Mar 2024 15:40 )  PTT:25.5 sec  Albumin: Albumin: 3.1 g/dL (03-15 @ 05:47)      Allergies    No Known Allergies    Intolerances      MEDICATIONS  (STANDING):  furosemide   Injectable 20 milliGRAM(s) IV Push once  gabapentin 100 milliGRAM(s) Oral three times a day  lidocaine   4% Patch 1 Patch Transdermal daily    MEDICATIONS  (PRN):  acetaminophen   IVPB .. 1000 milliGRAM(s) IV Intermittent once PRN Temp greater or equal to 38C (100.4F), Mild Pain (1 - 3)  HYDROmorphone  Injectable 0.5 milliGRAM(s) IV Push every 4 hours PRN Severe Pain (7 - 10)  ondansetron Injectable 4 milliGRAM(s) IV Push every 6 hours PRN Nausea      RADIOLOGY/ADDITIONAL STUDIES:  < from: CT Abdomen and Pelvis w/ IV Cont (03.14.24 @ 16:03) >    ACC: [id number] EXAM:  CT ABDOMEN AND PELVIS IC   ORDERED BY: JITENDRA GALARZA     ACC: [id number] EXAM:  CT CHEST IC   ORDERED BY: JITENDRA GALARZA     PROCEDURE DATE:  03/14/2024          INTERPRETATION:  CLINICAL INFORMATION: Trauma    COMPARISON: None.    CONTRAST/COMPLICATIONS:  IV Contrast: Omnipaque 350 (accession [id number]), IV contrast documented   in unlinked concurrent exam (accession [id number])  90 cc administered   0   cc discarded  Oral Contrast: NONE  Complications: None reported at time of study completion    PROCEDURE:  CT of the Chest, Abdomen and Pelvis was performed.  Imaging was performed through the chest in the arterial phase followed by   imaging of the abdomen and pelvis in the portal venous phase.  Sagittal and coronal reformatswere performed.    FINDINGS:  CHEST:  LUNGS AND LARGE AIRWAYS: Patent central airways. No pulmonary nodules.  PLEURA: Trace bilateral pleural effusions.  VESSELS: Severe atherosclerotic arterial calcification, including diffuse   native coronary artery calcification and calcification of at least one of   the bypass grafts.  HEART: Cardiomegaly. No pericardial effusion. Aortic valve calcifications   which can be seen in the setting of aortic stenosis.  MEDIASTINUM AND BHUPENDRA: No lymphadenopathy.  CHEST WALL AND LOWER NECK: Within normal limits.    ABDOMEN AND PELVIS:  LIVER: Within normal limits.  BILE DUCTS: Normal caliber.  GALLBLADDER: Within normal limits.  SPLEEN: Within normal limits.  PANCREAS: Within normal limits.  ADRENALS: Within normal limits.  KIDNEYS/URETERS: Within normal limits.    BLADDER: Within normal limits.  REPRODUCTIVE ORGANS: Prostate within normal limits.    BOWEL: No bowel obstruction. Appendix is not visualized.  PERITONEUM: No ascites.  VESSELS: Status post aortobiiliac endograft and right internal iliac   artery embolization. Aneurysmal dilatation of the left common iliac   artery to 3.4 cm.  Two left internal iliac artery aneurysms measuring 2.3   and 1.6 cm.  RETROPERITONEUM/LYMPH NODES: Moderate amount of hemorrhage in the left   retroperitoneum, left iliopsoas muscle and in the extraperitoneal spaces   of the pelvis bilaterally. No active bleeding identified..  ABDOMINAL WALL: Within normal limits.  BONES: Markedly comminuted fracture of the left acetabulum and left   ilium. Nondisplaced fracture of the left inferior pubic ramus.   Nonspecific sclerotic lesion in the right ilium, most likely a bone   island. Age indeterminate severe L3 compression deformity. Sternotomy.    IMPRESSION:    Markedly comminuted left acetabular/left iliac bone fracture.    Moderate amount of hemorrhage in the left retroperitoneum, left pelvis   and left iliopsoas muscle.    Nondisplaced fracture of the left inferior pubic ramus.    Findings were discussed with Dr. Restrepo 3/14/2024 4:21 PM by Dr. Fletcher   with read back confirmation.      < end of copied text >    < from: CT Head No Cont (03.14.24 @ 16:02) >    ACC: [id number] EXAM:  CT CERVICAL SPINE   ORDERED BY: JITENDRA GALARZA     ACC: [id number] EXAM:  CT BRAIN   ORDERED BY: JITENDRA GALARZA     PROCEDURE DATE:  03/14/2024          INTERPRETATION:  CT head without IV contrast    CLINICAL INFORMATION:  Fall   Intracranial hemorrhage.    TECHNIQUE: Contiguous axial 5 mm sections were obtained through the head.   Sagittal and coronal 2-D reformatted images were also obtained.   This   scan was performed using automatic exposure control (radiation dose   reduction software) to obtain a diagnostic image quality scan with   patient dose as low as reasonably achievable.    FINDINGS:   No previous examinations are available for review.    The brain demonstrates mild periventricular white matter ischemia.   No   acute cerebral cortical infarct is seen.  No intracranial hemorrhage is   found.  No mass effect is found in the brain.    The ventricles, sulci and basal cisterns show moderate temporal lobe   atrophy.    The orbits are unremarkable. The lenses are surgically small. The   paranasal sinuses are clear.  The nasal cavity appears intact.  The   nasopharynx is symmetric.  The central skull base, petrous temporal bones   and calvarium remain intact. Mild mucosal thickening in the RIGHT mastoid   air cells.        CT cervical spine without IV contrast    CLINICAL INFORMATION: Fracture, trauma, neck pain.  Neck pain, spinal   stenosis, spondylosis. fall    TECHNIQUE:  Contiguous axial 2.0 mm sections were obtained through the   cervical spine using a single helical acquisition.  Additional 2 mm   sagittal and coronal reformatted reconstructions of the spine were   obtained.  These additional reformatted images were used to evaluate the   spine for alignment, vertebral fractures and the integrityof the the   posterior elements.   This scan was performed using automatic exposure   control (radiation dose reduction software) to obtain a diagnostic image   quality scan with patient dose as low as reasonably achievable.    FINDINGS:   No prior similar studies are available for review    Cervical vertebral body heights are maintained. No vertebral fracture is   seen. No destructive bone lesion is found.  Alignment is preserved.    Facet joints appear intact and aligned.    Cervical intervertebral disc spaces show moderate to severe degenerative   disc disease and spondylosis at C4-5 through C7-T1 with loss of disc   height and associated degenerative endplate changes. There is narrowing   of the BILATERAL C3-4 through C7-T1 neural foramina due to uncovertebral   spurring and facet osteophytic hypertrophy. Marked RIGHT C2-3 facet   osteophytic hypertrophy is also noted. Posterior osteophytic ridge/disc   complexes at C4-5 through C7-T1 abut the ventral cord.    The skull base appears intact.  No neck mass is recognized.  Paraspinal   soft tissues appear intact. Visualized lymph nodes appear to be within   physiologic size limits.        IMPRESSION:    CT HEAD: Mild periventricular white matter ischemia.    Moderate temporal   lobe atrophy.    CT cervical spine:   No vertebral fracture is recognized.  Moderate to   severe degenerative disc disease and spondylosis at C4-5 through C7-T1   with narrowing of the BILATERAL C3-4 through C7-T1 neural foramina due to   uncovertebral spurring and facet osteophytic hypertrophy. Marked RIGHT   C2-3 facet osteophytic hypertrophy is also noted. Posterior osteophytic   ridge/disc complexes at C4-5 through C7-T1 abut the ventral cord.    --- End of Report ---          < end of copied text >  < from: Xray Chest 1 View- PORTABLE-Urgent (Xray Chest 1 View- PORTABLE-Urgent .) (03.14.24 @ 15:47) >    ACC: [id number] EXAM:  XR CHEST PORTABLE URGENT 1V   ORDERED BY: JITENDRA GALARZA     PROCEDURE DATE:  03/14/2024          INTERPRETATION:  AP chest on March 14, 2024 at 3:37 PM. A shunt trauma.    COMPARISON: None available.    Heart magnified by technique. Sternotomy is noted.    Lungs are clear. No fracture.    IMPRESSION: Sternotomy. No acute finding.

## 2024-03-15 NOTE — DIETITIAN INITIAL EVALUATION ADULT - LAB (SPECIFY)
Consider to obtain vitamin D 25OH level to assess nutriture   consider checking B6, B12, thiamine, folate, carnitine, and copper levels as malnutrition in cause these to be deficient

## 2024-03-16 LAB
ANION GAP SERPL CALC-SCNC: 8 MMOL/L — SIGNIFICANT CHANGE UP (ref 5–17)
BUN SERPL-MCNC: 46 MG/DL — HIGH (ref 7–23)
CALCIUM SERPL-MCNC: 8.3 MG/DL — LOW (ref 8.5–10.1)
CHLORIDE SERPL-SCNC: 112 MMOL/L — HIGH (ref 96–108)
CK SERPL-CCNC: 860 U/L — HIGH (ref 26–308)
CO2 SERPL-SCNC: 24 MMOL/L — SIGNIFICANT CHANGE UP (ref 22–31)
CREAT SERPL-MCNC: 1.37 MG/DL — HIGH (ref 0.5–1.3)
EGFR: 48 ML/MIN/1.73M2 — LOW
GLUCOSE SERPL-MCNC: 83 MG/DL — SIGNIFICANT CHANGE UP (ref 70–99)
GRAM STN FLD: ABNORMAL
HCT VFR BLD CALC: 23.9 % — LOW (ref 39–50)
HGB BLD-MCNC: 8 G/DL — LOW (ref 13–17)
MAGNESIUM SERPL-MCNC: 2.5 MG/DL — SIGNIFICANT CHANGE UP (ref 1.6–2.6)
MCHC RBC-ENTMCNC: 27.6 PG — SIGNIFICANT CHANGE UP (ref 27–34)
MCHC RBC-ENTMCNC: 33.5 GM/DL — SIGNIFICANT CHANGE UP (ref 32–36)
MCV RBC AUTO: 82.4 FL — SIGNIFICANT CHANGE UP (ref 80–100)
PHOSPHATE SERPL-MCNC: 2.9 MG/DL — SIGNIFICANT CHANGE UP (ref 2.5–4.5)
PLATELET # BLD AUTO: 104 K/UL — LOW (ref 150–400)
POTASSIUM SERPL-MCNC: 3.4 MMOL/L — LOW (ref 3.5–5.3)
POTASSIUM SERPL-SCNC: 3.4 MMOL/L — LOW (ref 3.5–5.3)
RBC # BLD: 2.9 M/UL — LOW (ref 4.2–5.8)
RBC # FLD: 17.3 % — HIGH (ref 10.3–14.5)
SODIUM SERPL-SCNC: 144 MMOL/L — SIGNIFICANT CHANGE UP (ref 135–145)
TROPONIN I, HIGH SENSITIVITY RESULT: 2768.6 NG/L — HIGH
WBC # BLD: 7.33 K/UL — SIGNIFICANT CHANGE UP (ref 3.8–10.5)
WBC # FLD AUTO: 7.33 K/UL — SIGNIFICANT CHANGE UP (ref 3.8–10.5)

## 2024-03-16 PROCEDURE — 99233 SBSQ HOSP IP/OBS HIGH 50: CPT

## 2024-03-16 PROCEDURE — ZZZZZ: CPT

## 2024-03-16 RX ORDER — VANCOMYCIN HCL 1 G
1000 VIAL (EA) INTRAVENOUS EVERY 24 HOURS
Refills: 0 | Status: DISCONTINUED | OUTPATIENT
Start: 2024-03-16 | End: 2024-03-18

## 2024-03-16 RX ORDER — POTASSIUM CHLORIDE 20 MEQ
40 PACKET (EA) ORAL EVERY 4 HOURS
Refills: 0 | Status: COMPLETED | OUTPATIENT
Start: 2024-03-16 | End: 2024-03-16

## 2024-03-16 RX ORDER — ASPIRIN/CALCIUM CARB/MAGNESIUM 324 MG
81 TABLET ORAL DAILY
Refills: 0 | Status: DISCONTINUED | OUTPATIENT
Start: 2024-03-16 | End: 2024-03-21

## 2024-03-16 RX ORDER — METOPROLOL TARTRATE 50 MG
12.5 TABLET ORAL DAILY
Refills: 0 | Status: DISCONTINUED | OUTPATIENT
Start: 2024-03-16 | End: 2024-03-18

## 2024-03-16 RX ADMIN — ATORVASTATIN CALCIUM 40 MILLIGRAM(S): 80 TABLET, FILM COATED ORAL at 21:25

## 2024-03-16 RX ADMIN — Medication 81 MILLIGRAM(S): at 15:36

## 2024-03-16 RX ADMIN — LIDOCAINE 1 PATCH: 4 CREAM TOPICAL at 23:47

## 2024-03-16 RX ADMIN — GABAPENTIN 100 MILLIGRAM(S): 400 CAPSULE ORAL at 21:25

## 2024-03-16 RX ADMIN — Medication 40 MILLIEQUIVALENT(S): at 15:37

## 2024-03-16 RX ADMIN — Medication 12.5 MILLIGRAM(S): at 10:26

## 2024-03-16 RX ADMIN — LIDOCAINE 1 PATCH: 4 CREAM TOPICAL at 19:25

## 2024-03-16 RX ADMIN — GABAPENTIN 100 MILLIGRAM(S): 400 CAPSULE ORAL at 15:37

## 2024-03-16 RX ADMIN — LIDOCAINE 1 PATCH: 4 CREAM TOPICAL at 10:26

## 2024-03-16 RX ADMIN — Medication 40 MILLIEQUIVALENT(S): at 10:26

## 2024-03-16 NOTE — PROGRESS NOTE ADULT - SUBJECTIVE AND OBJECTIVE BOX
89 yo M presenting s/p fall    Patient seen and examined on routine rounds.   He denies nausea, vomiting, fever or chills. Pain well controlled   Nurse report no acute event overnight   VS reviewed    Vital Signs Last 24 Hrs  T(C): 37 (16 Mar 2024 02:00), Max: 37.1 (15 Mar 2024 12:25)  T(F): 98.6 (16 Mar 2024 02:00), Max: 98.8 (15 Mar 2024 12:25)  HR: 71 (16 Mar 2024 02:00) (68 - 88)  BP: 127/56 (16 Mar 2024 02:00) (98/56 - 142/77)  BP(mean): 77 (16 Mar 2024 02:00) (70 - 97)  RR: 23 (16 Mar 2024 02:00) (13 - 30)  SpO2: 93% (16 Mar 2024 02:00) (91% - 100%)    Parameters below as of 16 Mar 2024 00:00  Patient On (Oxygen Delivery Method): room air        PHYSICAL EXAMS  GCS of 15  Airway is patent  Breathing is symmetric and unlabored  Neuro: AA ox2 intermittently, dementia at baseline, moving all 4, LLE in splint/cast  Psych: normal affect  HEENT: Normocephalic, atraumatic, BONITA, EOM wnl, no otorrhea or hemotympanum b/l, no epistaxis or d/c b/l nares, no craniofacial bony pathology or tenderness b/l  Neck: . No crepitus, no ecchymosis, no hematoma, to exam, , no tracheal deviation  Cspine/thoracolumbrosacral spine: no gross bony pathology or tenderness to exam  Cardiovascular: S1S2 Present  Chest: no gross rib pathology or tenderness to exam. No sternal pathology or tenderness to exam. No crepitus, no ecchymosis, no hematoma. No penetrating thorcoabdominal trauma  Respiratory: Rate is 18; Respiratory Effort normal; no wheezes, rales or rhonchi to exam  ABD: bowel sounds (+), soft, LLQ tenderness, non distended, no rebound, no guarding, no rigidity, no skin changes to exam. L hip tenderness   Genitourinary: No scrotal/perineal/perirectal hematoma/ecchymosis/tenderness to exam  External genitalia: normal, no blood at urethral meatus. Nagel in situ   Musculoskeletal: L leg with acewrap in traction, limited ROM.  Pt has palpable b/l radial, femoral, dorsalis pedis pulses. All digits are warm and well perfused.  Pt demonstrates grossly intact sensorimotor function. Pt has good capillary refill to digits, no calf edema or tenderness to exam.  Skin: multiple ecchymosis on b/l UE. Superficial abrasion at R elbow    MEDICATIONS  (STANDING):  atorvastatin 40 milliGRAM(s) Oral at bedtime  gabapentin 100 milliGRAM(s) Oral three times a day  lidocaine   4% Patch 1 Patch Transdermal daily    MEDICATIONS  (PRN):  acetaminophen   IVPB .. 1000 milliGRAM(s) IV Intermittent once PRN Temp greater or equal to 38C (100.4F), Mild Pain (1 - 3)  HYDROmorphone  Injectable 0.5 milliGRAM(s) IV Push every 4 hours PRN Severe Pain (7 - 10)  LORazepam   Injectable 0.5 milliGRAM(s) IV Push every 6 hours PRN Agitation  ondansetron Injectable 4 milliGRAM(s) IV Push every 6 hours PRN Nausea  I&O's Detail    14 Mar 2024 07:01  -  15 Mar 2024 07:00  --------------------------------------------------------  IN:    IV PiggyBack: 150 mL    PRBCs (Packed Red Blood Cells): 668 mL  Total IN: 818 mL    OUT:    Indwelling Catheter - Urethral (mL): 409 mL  Total OUT: 409 mL    Total NET: 409 mL      15 Mar 2024 07:01  -  16 Mar 2024 02:52  --------------------------------------------------------  IN:  Total IN: 0 mL    OUT:    Indwelling Catheter - Urethral (mL): 1800 mL  Total OUT: 1800 mL    Total NET: -1800 mL                            8.4    8.71  )-----------( 107      ( 15 Mar 2024 16:53 )             25.5   03-15    141  |  111<H>  |  57<H>  ----------------------------<  115<H>  4.1   |  23  |  2.17<H>    Ca    7.9<L>      15 Mar 2024 05:47  Phos  5.0     03-15  Mg     2.5     03-15    TPro  5.8<L>  /  Alb  3.1<L>  /  TBili  1.7<H>  /  DBili  x   /  AST  85<H>  /  ALT  46  /  AlkPhos  98  03-15    RADS:    < from: Xray Pelvis AP only (03.15.24 @ 09:30) >  IMPRESSION:  Comminuted impacted fracture of the left acetabulum with   protrusion of the femoral head medially into the pelvis.  No definite fracture of the left femur were knee.    There is questionable linear lucency in the distal fibula however   overlying bandages and splint limiting evaluation. Recommend dedicated   films without the splint when the patient is able.    < end of copied text >

## 2024-03-16 NOTE — SWALLOW BEDSIDE ASSESSMENT ADULT - ASR SWALLOW LABIAL MOBILITY
A very left right lip lag was apparent which pt stated is pre-existing. Pt's labial function is adequate for speech/deglutition nonetheless.

## 2024-03-16 NOTE — SWALLOW BEDSIDE ASSESSMENT ADULT - SWALLOW EVAL: RECOMMENDED DIET
SUGGEST A SOFT AND BITE SIZE DIET WITH THIN LIQUIDS AS THE PATIENT APPEARED CLINICALLY TOLERANT OF THESE FOOD CONSISTENCIES FROM AN OROPHARYNGEAL SWALLOWING PERSPECTIVE ON EXAM AND FOOD TEXTURES ON THIS DIET ACCOMMODATES HIS PRESBYPHAGIC FEATURES/EXPRESSED FOOD PREFERENCES.

## 2024-03-16 NOTE — SWALLOW BEDSIDE ASSESSMENT ADULT - SWALLOW EVAL: CRITERIA FOR SKILLED INTERVENTION MET
DO NOT FEEL THAT ACUTE SPEECH PATHOLOGY FOLLOW UP WOULD CHANGE CLINICAL MANAGEMENT/OUTCOME IN HOSPITAL SETTING. PT'S MILD FUNCTIONAL PRESBYPHAGIA IS STABLE FOR AGE AND NO PRIMARY SPEECH-LANGUAGE PATHOLOGY WAS EVIDENT ON EXAM. PT'S OROPHARYNGEAL SWALLOWING/SPEECH-LANGUAGE INTEGRITY ARE FELT TO BE AT USUAL STATE/ARE MAXIMIZED. GIVEN ABOVE, THIS SERVICE WILL NOT ACTIVELY FOLLOW. RECONSULT PRN SHOULD STATUS CHANGE AND CONDITION WARRANT.

## 2024-03-16 NOTE — PROGRESS NOTE ADULT - ASSESSMENT
89 year old on aspirin s/p fall last night  with acetabular, rami fracture displaced with Acute blood loss anemia  hgb  5.9, after 1 unit blood on admission, got 3 unit total now 7.1  creatinine 2.3, no known kidney disease  JVD  dec lv function on POCUS  positive troponin  inc cpk possible rhabdomyolyiss    1. Acute blood loss anemai, hgb now stable seconday to acetabular fracture  2.  dilaudid prn pain  3. possible rhabdo cpk now downtrending  4/ awaiting echo  4. BALWINDER monitor, got contrast, creatinine improving  5. Paitent DNR/DNI,  confirmed with family, [facility]OL[facility] on chart  6. Acetabular fracture   7. Non stemi, start aspirin when bleeding stabilized start low dose bblocker, ace when creatinine stble  may transfer to floor

## 2024-03-16 NOTE — PROGRESS NOTE ADULT - ASSESSMENT
89yo M presents s/p unwitnessed fall with comminuted left acetabular/pelvic fracture with moderate hematoma in left retroperitoneum/pelvis/iliopsoas muscle    Per family discussion, discussed with daughter, family agrees with conservative treatment, but would not want any surgical intervention. verbal DNR/DNI    Plan:  -multimodal pain control prn  - Trend troponins, CBC. transfuse prn   - monitor VS  - cardiology recs appreciated   - cont GOC discussion with family  - continue IVF  - orthopedic surgery recs appreciated   - SCD for DVT PPX, hold pharmaceutical ppx and aspirin    Plan discussed with trauma surgery team and attending,

## 2024-03-16 NOTE — SWALLOW BEDSIDE ASSESSMENT ADULT - SWALLOW EVAL: PROGNOSIS
2) The pt demonstrates relatively mild functional Oropharyngeal Presbyphagia with grossly sufficient oropharyngeal swallowing preservation for age(89) nonetheless. Bolus formation/transfer were mildly prolonged but mechanically functional for age, pt cleared oral debris after age acceptable piecemeal deglutition & laryngeal lift on palpation during swallowing trials was very mildly decreased but felt to be functional for age as well. While Presbyphagia may place pt at a relatively heightened risk for episodic aspiration, he did not demonstrate any behavioral aspiration signs on exam. No change in O2 sats noted. Odynophagia denied. Oropharyngeal swallowing integrity appears stable for age when in an alert state. Note that pt verbalized a preference for his foods to be soft and cut up.

## 2024-03-16 NOTE — PROGRESS NOTE ADULT - PROBLEM SELECTOR PLAN 2
-reported hx CAD s/p CABG. Dr. Kwan on 3/15 attempted to call all 3 children for further information but unable to reach.   -denies current chest pain. not currently candidate for ischemic evaluation.

## 2024-03-16 NOTE — SWALLOW BEDSIDE ASSESSMENT ADULT - SWALLOW EVAL: SECRETION MANAGEMENT
No drooling was noted. Of note is that strength of volitional cough was grossly functional. Though it is noted that his cough was somewhat moist.

## 2024-03-16 NOTE — PROGRESS NOTE ADULT - PROBLEM SELECTOR PLAN 1
-NSTEMI pt presenting after a fall found to have Hs trop 393 > 1079 > 3223 > 2768  -ECG NSR nonspecific ST-T wave abnormality. No chest pain. in setting hx CAD. unclear if type II vs type I given acute blood loss anemia.   -Not currently a cath candidate given acute anemia due to left RP bleed, renal dysfunction and altered mental status     -pt refused echo, will try again mon.  -start ASA if safe from surgical standpoint, cont. statin.

## 2024-03-16 NOTE — SWALLOW BEDSIDE ASSESSMENT ADULT - ASPIRATION PRECAUTIONS
Note that while functional Presbyphagia may place patient at a relatively heightened risk for episodic aspiration, he did not demonstrate any behavioral aspiration signs on exam./yes

## 2024-03-16 NOTE — PROGRESS NOTE ADULT - SUBJECTIVE AND OBJECTIVE BOX
HPI:  Mr. Bravo is a 89 yo male with a hx CAD s/p CABG, PAD s/p EVAR iliac aneurysm repair presenting after an unwitnessed fall. Pt was found on floor; no reported LOC or head strike.   Currently confused and unable to provide additional information. He denies chest pain, SOB.     In the ED, he was hypotensive 92% on room air.   Labs notable for  hgb 5.9, WBC 10.75, plt 103, Cr 2.35, Hs trop 393, 1079, 3223.   ECG NSR nonspecific ST-T wave abnormality.   CTAP showed Markedly comminuted left acetabular/left iliac bone fracture,   Moderate amount of hemorrhage in the left retroperitoneum,   left pelvis and left iliopsoas muscle, Nondisplaced fracture of the left inferior pubic ramus.    Received 2 units prbc.   3/16/'24: no cardiac complaints. he reports his PCP & cardiologist is Dr. Miguel Massey.    MEDICATIONS:  OUTPATIENT  Home Medications:  aspirin 81 mg oral tablet: orally once a day (14 Mar 2024 18:40)  atorvastatin 40 mg oral tablet: 1 tab(s) orally once a day (14 Mar 2024 18:39)  CoQ10 100 mg oral capsule: 1 cap(s) orally once a day (14 Mar 2024 18:40)  DULoxetine 30 mg oral delayed release capsule: 1 cap(s) orally once a day (14 Mar 2024 18:39)  losartan 25 mg oral tablet: 1 tab(s) orally once a day (14 Mar 2024 18:39)  naproxen 250 mg oral tablet: 1 tab(s) orally 2 times a day (14 Mar 2024 18:41)  Ocuvite oral tablet: 1 tab(s) orally once a day (14 Mar 2024 18:41)  Vitamin D3 25 mcg (1000 intl units) oral tablet: 1 tab(s) orally once a day (14 Mar 2024 18:41)      INPATIENT  MEDICATIONS  (STANDING):  aspirin  chewable 81 milliGRAM(s) Oral daily  atorvastatin 40 milliGRAM(s) Oral at bedtime  gabapentin 100 milliGRAM(s) Oral three times a day  lidocaine   4% Patch 1 Patch Transdermal daily  metoprolol tartrate 12.5 milliGRAM(s) Oral daily    MEDICATIONS  (PRN):  acetaminophen   IVPB .. 1000 milliGRAM(s) IV Intermittent once PRN Temp greater or equal to 38C (100.4F), Mild Pain (1 - 3)  HYDROmorphone  Injectable 0.5 milliGRAM(s) IV Push every 4 hours PRN Severe Pain (7 - 10)  ondansetron Injectable 4 milliGRAM(s) IV Push every 6 hours PRN Nausea          Vital Signs Last 24 Hrs  T(C): 36.3 (16 Mar 2024 15:35), Max: 37.3 (16 Mar 2024 05:00)  T(F): 97.3 (16 Mar 2024 15:35), Max: 99.1 (16 Mar 2024 05:00)  HR: 62 (16 Mar 2024 15:35) (62 - 79)  BP: 104/63 (16 Mar 2024 15:35) (103/63 - 131/66)  BP(mean): 82 (16 Mar 2024 10:00) (71 - 85)  RR: 19 (16 Mar 2024 15:35) (15 - 24)  SpO2: 92% (16 Mar 2024 15:35) (91% - 100%)    Parameters below as of 16 Mar 2024 15:35  Patient On (Oxygen Delivery Method): room air    Daily     Daily I&O's Summary    15 Mar 2024 07:01  -  16 Mar 2024 07:00  --------------------------------------------------------  IN: 0 mL / OUT: 2300 mL / NET: -2300 mL    16 Mar 2024 07:01  -  16 Mar 2024 15:46  --------------------------------------------------------  IN: 0 mL / OUT: 260 mL / NET: -260 mL        I&O's Detail    15 Mar 2024 07:01  -  16 Mar 2024 07:00  --------------------------------------------------------  IN:  Total IN: 0 mL    OUT:    Indwelling Catheter - Urethral (mL): 2300 mL  Total OUT: 2300 mL    Total NET: -2300 mL      16 Mar 2024 07:01  -  16 Mar 2024 15:46  --------------------------------------------------------  IN:  Total IN: 0 mL    OUT:    Indwelling Catheter - Urethral (mL): 260 mL  Total OUT: 260 mL    Total NET: -260 mL          I&O's Summary    15 Mar 2024 07:01  -  16 Mar 2024 07:00  --------------------------------------------------------  IN: 0 mL / OUT: 2300 mL / NET: -2300 mL    16 Mar 2024 07:01  -  16 Mar 2024 15:46  --------------------------------------------------------  IN: 0 mL / OUT: 260 mL / NET: -260 mL        PHYSICAL EXAM:    PHYSICAL EXAM:  Constitutional: NAD, awake and alert but confused   HEENT:  EOMI,  Pupils round, No oral cyanosis.  Pulmonary: Non-labored, breath sounds are clear bilaterally, No wheezing, rales or rhonchi  Cardiovascular: S1 and S2, regular rate and rhythm, no Murmurs, gallops or rubs  Gastrointestinal:  soft, nontender.   Lymph: No peripheral edema. No cervical lymphadenopathy.  Neurological: Alert,  disoriented  Skin: No rashes.       ===============================  ===============================  LABS:                         8.0    7.33  )-----------( 104      ( 16 Mar 2024 05:38 )             23.9                         8.4    8.71  )-----------( 107      ( 15 Mar 2024 16:53 )             25.5                         8.5    9.83  )-----------( 110      ( 15 Mar 2024 13:17 )             26.2     16 Mar 2024 05:38    144    |  112    |  46     ----------------------------<  83     3.4     |  24     |  1.37   15 Mar 2024 05:47    141    |  111    |  57     ----------------------------<  115    4.1     |  23     |  2.17   14 Mar 2024 15:40    140    |  110    |  53     ----------------------------<  109    4.7     |  21     |  2.35     Ca    8.3        16 Mar 2024 05:38  Ca    7.9        15 Mar 2024 05:47  Ca    8.6        14 Mar 2024 15:40  Phos  2.9       16 Mar 2024 05:38  Phos  5.0       15 Mar 2024 05:47  Phos  5.6       14 Mar 2024 15:40  Mg     2.5       16 Mar 2024 05:38  Mg     2.5       15 Mar 2024 05:47  Mg     2.4       14 Mar 2024 15:40    TPro  5.8    /  Alb  3.1    /  TBili  1.7    /  DBili  x      /  AST  85     /  ALT  46     /  AlkPhos  98     15 Mar 2024 05:47  TPro  5.6    /  Alb  3.2    /  TBili  1.7    /  DBili  x      /  AST  74     /  ALT  46     /  AlkPhos  104    14 Mar 2024 15:40    PT/INR - ( 15 Mar 2024 13:17 )   PT: 12.6 sec;   INR: 1.12 ratio         PTT - ( 15 Mar 2024 13:17 )  PTT:24.6 sec  ===============================  ===============================  CARDIAC BIOMARKERS:  BNP      TROPONIN  Troponin I, High Sensitivity Result: 2768.60 ng/L *H* (03-16-24 @ 05:38)  Troponin I, High Sensitivity Result: 3223.96 ng/L *H* (03-15-24 @ 05:47)  Troponin I, High Sensitivity Result: 1079.88 ng/L *H* (03-14-24 @ 19:43)  Troponin I, High Sensitivity Result: 393.80 ng/L *H* (03-14-24 @ 15:40)    ===============================  ===============================  EKG:    < from: 12 Lead ECG (03.14.24 @ 15:05) >    Diagnosis Line Normal sinus rhythm  Nonspecific ST and T wave abnormality  Prolonged QT  Abnormal ECG  No previous ECGs available  Confirmed by MD BERNSTEIN PAUL ([id number]) on 3/15/2024 1:06:00 PM    < end of copied text >    ===============================  ECHO:  pt refused echo    ===============================    Jeffry Fitch M.D.  Cardiology, [facility]  Cell: [phone]  Offices:    ([facility] Office)  [phone] ([facility] Office)

## 2024-03-16 NOTE — SWALLOW BEDSIDE ASSESSMENT ADULT - SWALLOW EVAL: DIAGNOSIS
1) On encounter, a loss of bulk was apparent in pt's strap muscle regions. A mild left lip lag was evident which pt stated "was always there". The pt was alert and interactive. He was able to verbalize during communicative probes. At these times, pt's motor speech integrity was felt to be functional, his speech output was intelligible, and his verbalizations were linguistically intact. Pt was oriented to name, setting and year. The pt was able to verbalize his needs on a concrete level.

## 2024-03-16 NOTE — PROGRESS NOTE ADULT - SUBJECTIVE AND OBJECTIVE BOX
Interval History:        Patient agitated at times        HGb 8.0        troponin 2700 decreasing        Per Ortho no plan for surgery        Had gotten a total of 3 untis of blood        creatinine improved    HPI:          89 year old male who fell last night and then was found down today    PMH CAD, endovascular repairs           no known kidney  disease, no heart failure known  In ED. Patient had MTP called, got 1, 1, 1, hgb 5.9  was on aspirin at home  CT showed left acetabular fracture, with inferior rami fracture, no blush on CTA hgb 5.9 got 3 units of blood  Patient admitted to ICU    POCUS in ED - poor LV function     ROS some hip pain, no chest pain, no sob        MEDICATIONS  (STANDING):  atorvastatin 40 milliGRAM(s) Oral at bedtime  gabapentin 100 milliGRAM(s) Oral three times a day  lidocaine   4% Patch 1 Patch Transdermal daily  metoprolol tartrate 12.5 milliGRAM(s) Oral daily  potassium chloride    Tablet ER 40 milliEquivalent(s) Oral every 4 hours    MEDICATIONS  (PRN):  acetaminophen   IVPB .. 1000 milliGRAM(s) IV Intermittent once PRN Temp greater or equal to 38C (100.4F), Mild Pain (1 - 3)  HYDROmorphone  Injectable 0.5 milliGRAM(s) IV Push every 4 hours PRN Severe Pain (7 - 10)  ondansetron Injectable 4 milliGRAM(s) IV Push every 6 hours PRN Nausea    ICU Vital Signs Last 24 Hrs  T(C): 36.9 (16 Mar 2024 08:00), Max: 37.3 (16 Mar 2024 05:00)  T(F): 98.4 (16 Mar 2024 08:00), Max: 99.1 (16 Mar 2024 05:00)  HR: 71 (16 Mar 2024 08:00) (67 - 88)  BP: 119/65 (16 Mar 2024 08:00) (103/63 - 142/77)  BP(mean): 82 (16 Mar 2024 08:00) (71 - 97)  ABP: --  ABP(mean): --  RR: 24 (16 Mar 2024 08:00) (13 - 30)  SpO2: 98% (16 Mar 2024 08:00) (91% - 100%)    O2 Parameters below as of 16 Mar 2024 08:00  Patient On (Oxygen Delivery Method): room air    I&O's Summary    15 Mar 2024 07:01  -  16 Mar 2024 07:00  --------------------------------------------------------  IN: 0 mL / OUT: 2300 mL / NET: -2300 mL                        8.0    7.33  )-----------( 104      ( 16 Mar 2024 05:38 )             23.9       03-16    144  |  112<H>  |  46<H>  ----------------------------<  83  3.4<L>   |  24  |  1.37<H>    Ca    8.3<L>      16 Mar 2024 05:38  Phos  2.9     03-16  Mg     2.5     03-16    TPro  5.8<L>  /  Alb  3.1<L>  /  TBili  1.7<H>  /  DBili  x   /  AST  85<H>  /  ALT  46  /  AlkPhos  98  03-15      CARDIAC MARKERS ( 16 Mar 2024 05:38 )  x     / x     / 860 U/L / x     / x      CARDIAC MARKERS ( 15 Mar 2024 05:47 )  x     / x     / 2006 U/L / x     / x      CARDIAC MARKERS ( 14 Mar 2024 19:43 )  x     / x     / 2097 U/L / x     / x      CARDIAC MARKERS ( 14 Mar 2024 15:40 )  x     / x     / 2106 U/L / x     / x        Urinalysis Basic - ( 16 Mar 2024 05:38 )    Color: x / Appearance: x / SG: x / pH: x  Gluc: 83 mg/dL / Ketone: x  / Bili: x / Urobili: x   Blood: x / Protein: x / Nitrite: x   Leuk Esterase: x / RBC: x / WBC x   Sq Epi: x / Non Sq Epi: x / Bacteria: x    DVT Prophylaxis:   held for bleeding                                                              Advanced Directives: Paitent DNR/DNI    Labs, Notes, Orders, radiologic studies reviewed and care coordinated with multidisciplinary team

## 2024-03-16 NOTE — SWALLOW BEDSIDE ASSESSMENT ADULT - NS SPL SWALLOW CLINIC TRIAL FT
The pt demonstrated relatively mild functional Oropharyngeal Presbyphagia with grossly sufficient oropharyngeal swallowing preservation for age(89) nonetheless. Bolus formation/transfer were mildly prolonged but mechanically functional for age, pt cleared oral debris after age acceptable piecemeal deglutition & laryngeal lift on palpation during swallowing trials was very mildly decreased but felt to be functional for age as well. While Presbyphagia may place pt at a relatively heightened risk for episodic aspiration, he did not demonstrate any behavioral aspiration signs on exam. No change in O2 sats noted. Odynophagia denied. Oropharyngeal swallowing integrity appears stable for age when in an alert state. Note that pt verbalized a preference for his foods to be soft and cut up.

## 2024-03-16 NOTE — SWALLOW BEDSIDE ASSESSMENT ADULT - SLP GENERAL OBSERVATIONS
On encounter, a loss of bulk was apparent in pt's strap muscle regions. A mild left lip lag was evident which pt stated "was always there". The pt was alert and interactive. He was able to verbalize during communicative probes. At these times, pt's motor speech integrity was felt to be functional, his speech output was intelligible, and his verbalizations were linguistically intact. Pt was oriented to name, setting and year. The pt was able to verbalize his needs on a concrete level.

## 2024-03-16 NOTE — SWALLOW BEDSIDE ASSESSMENT ADULT - COMMENTS
The pt was admitted to  status post unwitnessed fall. Hospital course is remarkable for finding of left acetabular/inferior pubic rami fractures, anemia, elevated Troponin, rhabdomyolysis, BALWINDER, hypoalbuminemia, and encephalopathy. This profile is superimposed upon a history of CAD, HTN, HLD, presence of right iliac artery aneurysm, and PVD status post multiple endovascular repairs. The pt was admitted to  status post unwitnessed fall. Hospital course is remarkable for finding of left acetabular/inferior rami fractures, anemia status post transfusion, elevated Troponin, rhabdomyolysis, BALWINDER, hypoalbuminemia, and encephalopathy. This profile is superimposed upon a history of CAD, HTN, HLD, presence of right iliac artery aneurysm, and PVD status post multiple endovascular repairs.

## 2024-03-16 NOTE — PROGRESS NOTE ADULT - SUBJECTIVE AND OBJECTIVE BOX
SUBJECTIVE:       Pt seen and examined at bedside. S/p 1u PRBC, otherwise no acute events overnight. Patient subjective limited 2/2 confusion.    Vital Signs (24 Hrs):  T(C): 37 (03-16-24 @ 07:00), Max: 37.3 (03-16-24 @ 05:00)  HR: 67 (03-16-24 @ 07:00) (67 - 88)  BP: 109/67 (03-16-24 @ 07:00) (103/63 - 142/77)  RR: 18 (03-16-24 @ 07:00) (13 - 30)  SpO2: 92% (03-16-24 @ 07:00) (91% - 100%)  Wt(kg): --    LABS:                          8.0    7.33  )-----------( 104      ( 16 Mar 2024 05:38 )             23.9     03-16    144  |  112<H>  |  46<H>  ----------------------------<  83  3.4<L>   |  24  |  1.37<H>    Ca    8.3<L>      16 Mar 2024 05:38  Phos  2.9     03-16  Mg     2.5     03-16    TPro  5.8<L>  /  Alb  3.1<L>  /  TBili  1.7<H>  /  DBili  x   /  AST  85<H>  /  ALT  46  /  AlkPhos  98  03-15    LIVER FUNCTIONS - ( 15 Mar 2024 05:47 )  Alb: 3.1 g/dL / Pro: 5.8 gm/dL / ALK PHOS: 98 U/L / ALT: 46 U/L / AST: 85 U/L / GGT: x           PT/INR - ( 15 Mar 2024 13:17 )   PT: 12.6 sec;   INR: 1.12 ratio         PTT - ( 15 Mar 2024 13:17 )  PTT:24.6 sec      Gen: AOx2, Resting comfortably w/ Yudi hugger    LLE:  In [place] traction boot  Skin intact, no erythema or ecchymosis appreciated at area of injury  TTP throughout R hip and pelvis, otherwise NTTP  Moves toes  SILT toes 1-5  + DP, cap refill <2sec  Compartments soft and compressible  No calf tenderness    A/P: 89M w/ L comminuted acetabular fx  Admitted to ICU, appreciate primary care  Per conversation w/ trauma sx low concern for acute bleed given lack of extravasation on CTA - plan to monitor in ICU  If continued HD instability or further decompensates would recommend urgent IR consult for possible embolization  Currently no acute ortho surgery/intervention indicated per attending and orthopaedic traumatologist - family made aware and agrees  Placed in [place] traction in the meantime for pain relief and improved hip positioning - will likely DC today 3/16  NWB RLE, can be OOB to chair  Multimodal analgesia  DVT ppx: per primary - recommend SCDs bilaterally in setting of hematoma  PT/OT when tolerable  Ice as tolerated  Discussed with attending Dr. Nair who is in agreement with above plan, will update as needed

## 2024-03-17 LAB
-  COAGULASE NEGATIVE STAPHYLOCOCCUS: SIGNIFICANT CHANGE UP
ANION GAP SERPL CALC-SCNC: 5 MMOL/L — SIGNIFICANT CHANGE UP (ref 5–17)
BUN SERPL-MCNC: 31 MG/DL — HIGH (ref 7–23)
CALCIUM SERPL-MCNC: 8.4 MG/DL — LOW (ref 8.5–10.1)
CHLORIDE SERPL-SCNC: 114 MMOL/L — HIGH (ref 96–108)
CO2 SERPL-SCNC: 25 MMOL/L — SIGNIFICANT CHANGE UP (ref 22–31)
CREAT SERPL-MCNC: 1.02 MG/DL — SIGNIFICANT CHANGE UP (ref 0.5–1.3)
CULTURE RESULTS: ABNORMAL
EGFR: 69 ML/MIN/1.73M2 — SIGNIFICANT CHANGE UP
GLUCOSE SERPL-MCNC: 98 MG/DL — SIGNIFICANT CHANGE UP (ref 70–99)
HCT VFR BLD CALC: 26.2 % — LOW (ref 39–50)
HGB BLD-MCNC: 8.4 G/DL — LOW (ref 13–17)
MAGNESIUM SERPL-MCNC: 2.5 MG/DL — SIGNIFICANT CHANGE UP (ref 1.6–2.6)
MCHC RBC-ENTMCNC: 26.8 PG — LOW (ref 27–34)
MCHC RBC-ENTMCNC: 32.1 GM/DL — SIGNIFICANT CHANGE UP (ref 32–36)
MCV RBC AUTO: 83.7 FL — SIGNIFICANT CHANGE UP (ref 80–100)
METHOD TYPE: SIGNIFICANT CHANGE UP
ORGANISM # SPEC MICROSCOPIC CNT: ABNORMAL
ORGANISM # SPEC MICROSCOPIC CNT: SIGNIFICANT CHANGE UP
PHOSPHATE SERPL-MCNC: 1.6 MG/DL — LOW (ref 2.5–4.5)
PLATELET # BLD AUTO: 105 K/UL — LOW (ref 150–400)
POTASSIUM SERPL-MCNC: 3.7 MMOL/L — SIGNIFICANT CHANGE UP (ref 3.5–5.3)
POTASSIUM SERPL-SCNC: 3.7 MMOL/L — SIGNIFICANT CHANGE UP (ref 3.5–5.3)
RBC # BLD: 3.13 M/UL — LOW (ref 4.2–5.8)
RBC # FLD: 17.7 % — HIGH (ref 10.3–14.5)
SODIUM SERPL-SCNC: 144 MMOL/L — SIGNIFICANT CHANGE UP (ref 135–145)
SPECIMEN SOURCE: SIGNIFICANT CHANGE UP
TROPONIN I, HIGH SENSITIVITY RESULT: 1798.38 NG/L — HIGH
WBC # BLD: 5.65 K/UL — SIGNIFICANT CHANGE UP (ref 3.8–10.5)
WBC # FLD AUTO: 5.65 K/UL — SIGNIFICANT CHANGE UP (ref 3.8–10.5)

## 2024-03-17 PROCEDURE — 99233 SBSQ HOSP IP/OBS HIGH 50: CPT

## 2024-03-17 PROCEDURE — 99232 SBSQ HOSP IP/OBS MODERATE 35: CPT

## 2024-03-17 RX ORDER — SODIUM,POTASSIUM PHOSPHATES 278-250MG
1 POWDER IN PACKET (EA) ORAL ONCE
Refills: 0 | Status: COMPLETED | OUTPATIENT
Start: 2024-03-17 | End: 2024-03-17

## 2024-03-17 RX ORDER — SODIUM,POTASSIUM PHOSPHATES 278-250MG
1 POWDER IN PACKET (EA) ORAL ONCE
Refills: 0 | Status: DISCONTINUED | OUTPATIENT
Start: 2024-03-17 | End: 2024-03-17

## 2024-03-17 RX ADMIN — Medication 1 PACKET(S): at 16:00

## 2024-03-17 RX ADMIN — GABAPENTIN 100 MILLIGRAM(S): 400 CAPSULE ORAL at 13:09

## 2024-03-17 RX ADMIN — Medication 250 MILLIGRAM(S): at 01:00

## 2024-03-17 RX ADMIN — Medication 250 MILLIGRAM(S): at 23:52

## 2024-03-17 RX ADMIN — Medication 81 MILLIGRAM(S): at 09:53

## 2024-03-17 RX ADMIN — GABAPENTIN 100 MILLIGRAM(S): 400 CAPSULE ORAL at 21:09

## 2024-03-17 RX ADMIN — ATORVASTATIN CALCIUM 40 MILLIGRAM(S): 80 TABLET, FILM COATED ORAL at 21:09

## 2024-03-17 RX ADMIN — Medication 12.5 MILLIGRAM(S): at 09:54

## 2024-03-17 RX ADMIN — LIDOCAINE 1 PATCH: 4 CREAM TOPICAL at 09:53

## 2024-03-17 RX ADMIN — GABAPENTIN 100 MILLIGRAM(S): 400 CAPSULE ORAL at 05:26

## 2024-03-17 RX ADMIN — LIDOCAINE 1 PATCH: 4 CREAM TOPICAL at 19:55

## 2024-03-17 RX ADMIN — LIDOCAINE 1 PATCH: 4 CREAM TOPICAL at 22:50

## 2024-03-17 NOTE — CONSULT NOTE ADULT - ASSESSMENT
89 year old on aspirin s/p fall last night  with acetabular, rami fracture displaced with Acute blood loss anemia  hgb  5.9, after 1 unit blood on admission, got 3 unit total now 7.1  creatinine 2.3, no known kidney disease        #S/p fall s/p L pelvic fracture  #ABLA s/p prbc transfusion  #L RP hematoma/L iliopsoas hematoma  Currently no acute ortho surgery/intervention indicated per attending and orthopaedic traumatologist - family made aware and agrees  - WBAT per ortho  - F/U Dr Nair o/p  - multi modal pain control        #NSTEMI  - pt refusing echo  - cardiology following  - cardiology not pursuing echo at this time as pt continues to refuse therefore no TAVR is planned  - cardiology recc anti-plt therapy + statin once surgery clears   - BB/ ACE-I  - DNR//I      #Coag negative STAU bacteremia  - 1 bottle isolated  - no leukocytosis or fevers  - pt non toxic appearing  - doubt real infection and suspect contaminated specimen  - if remains afebrile, recc dc abx  - repeat blood cultures       #BALWINDER monitor,   - got contrast, creatinine improving  - resolved        DC planning. PT consult. No other planned interventions per cardiology and orthopedics  Time spent: 60 min

## 2024-03-17 NOTE — PHYSICAL THERAPY INITIAL EVALUATION ADULT - REHAB POTENTIAL, PT EVAL
D/I/A: Pt roomed on 3C in bay 30.  Arrived via litter and accompanied by RN off monitor.  VSSA.  Rhythm upon arrival SR on monitor.  Denies pain or sob.  Reviewed activity restrictions and when to notify RN, ie-changes to breathing or increased chest pressure or chest pain.  CCL access:  R radial access site with 15cc of air in it.  P: Continue to monitor status.  Discharge to home once meeting criteria.       Dr. Carver came and spoke to pt.  Pt agreed to discharge to assisted living.     fair, will monitor progress closely

## 2024-03-17 NOTE — PROGRESS NOTE ADULT - ATTENDING COMMENTS
A/P:  Left pelvic/ilium/acetabular fracture  Left extra/retroperitoneal hematoma  H/H stable s/p blood product transfusion for acute blood loss anemia  Cardiology on consult, r/o NSTEMI  Hospitalist on consult for med mgmt  Ortho on consult, no intervention  GI/DVT prophylaxis  Pain control  F/U labs  Cont current care and meds  Fall risk precautions    35 minuted of time spent on pt examination, review of relevant labs and radiologic studies, assured stabilization of pt, discussion with relevant services/providers for coordination of pt care and services

## 2024-03-17 NOTE — PHYSICAL THERAPY INITIAL EVALUATION ADULT - GENERAL OBSERVATIONS, REHAB EVAL
Pt rec'd supine in bed, very confused but cooperative with PT with some encouragement, no c/o pain at rest.

## 2024-03-17 NOTE — CONSULT NOTE ADULT - CONSULT REQUESTED DATE/TIME
14-Mar-2024
17-Mar-2024 12:36
14-Mar-2024 20:12
15-Mar-2024 10:28
17-Mar-2024 14:53
15-Mar-2024 13:50

## 2024-03-17 NOTE — CONSULT NOTE ADULT - CONSULT REQUESTED BY NAME
Ice pack given:    __X___yes _____no    Discharge instructions given to patient:    __X___yes _____no    Discharged via:    __X___amulatory    _____wheelchair    _____stretcher    Stable on discharge:    __X___yes ____no
Patient arrived via:    _x____ambulatory    _____wheelchair    _____stretcher      Domestic violence screen    ____x__negative______positive    Breast Implants:    _______yes ____x____no
Procedure type:    _x____ultrasound guided _____stereotactic    Breast:    _____Left __x___Right    Location: 3:00 2cmfn    Needle: 12g Katiana    # of passes: 3    Clip: Heart    Performed by: Dr Regina Trammell held for 5 minutes by: Bobbi Dixon Strips:    __x___yes _____no    Band aid:    __x___yes_____no    Tape and guaze:    _____yes __x___no    Tolerated procedure:    ___x__yes _____no
ED
Dr. Restrepo
Trauma
Dr Rolle
[place]aye
Dr. Romain Restrepo

## 2024-03-17 NOTE — PHYSICAL THERAPY INITIAL EVALUATION ADULT - PERTINENT HX OF CURRENT PROBLEM, REHAB EVAL
89yo M presents s/p unwitnessed fall with comminuted left acetabular/pelvic fracture with moderate hematoma in left retroperitoneum/pelvis/iliopsoas muscle    Per family discussion, discussed with daughter, family agrees with conservative treatment, but would not want any surgical intervention. verbal DNR/DNI

## 2024-03-17 NOTE — CONSULT NOTE ADULT - REASON FOR ADMISSION
unwitnessed fall

## 2024-03-17 NOTE — PHYSICAL THERAPY INITIAL EVALUATION ADULT - DIAGNOSIS, PT EVAL
s/p unwitnessed fall with comminuted left acetabular/pelvic fracture with moderate hematoma in left retroperitoneum/pelvis/iliopsoas muscle

## 2024-03-17 NOTE — PROGRESS NOTE ADULT - ASSESSMENT
89 yo M with PMHx as above presents after being found on the ground found to have elevated troponins with rhabdo and a retroperitoneal bleed    -Elevated troponins - may be demand ischemia in setting of rhabdo and BALWINDER. Poor historian however. No significant changes to his EKG from prior ones in the office. He refuses echocardiogram. With his retroperitoneal bleed, he would not be a candidate for a LHC/PCI anyway. C/W ASA, statin, BB.  -Unknown reason for fall. He has history of mechanical falls but also has h/o moderate-severe AS. Patient refused echo yesterday but will try to get it tomorrow. If he continues to refuses will need to further discuss goals of care. If he would not go for TAVR anyway then there is no point in forcing him to get an echocardiogram

## 2024-03-17 NOTE — CONSULT NOTE ADULT - ASSESSMENT
89 y/o Male with h/o CAD s/p CABG and EVAR biliac aneurysm repair was admitted on 3/14 for increased weakness s/p unwitnessed fall this morning. The patient was found on floor, denied loss of conscious or head strike. Patient was on aspirin. Patient had pain in his pelvis. Trauma workup revealed CT showing L acetabular fracture with inferior pubic rami fracture, no blush, noted L retroperitoneum hematoma. Initial CBC Hbg 5.9 received 1pRBC/1plt/1FFP on arrival in ED, then further tx'd 2 pRBCs. He was felt to have acute blood loss anemia secondary to pelvic hemorrhage. Treatment for his fracture was considered, but no acute surgical procedure is contemplated. On 3/16 he was reported with bacteremia. Concern of an infectious process was raised.    1. Bacteremia with CNST. Pelvic hemorrhage. Episode of hypotension. Anemia. Left hip acetabular fracture.  -cultures reviewed  -one BC bottle is showing CNST --> likely contaminant  -no clinical signs of sepsis  -would hold off further abx therapy at this time  -ortho evaluation appreicated  -old chart reviewed to assess prior cultures  -monitor temps  -f/u CBC  -supportive care  2. Other issues:   -care per medicine    IV to PO abx token dose not apply   89 y/o Male with h/o CAD s/p CABG and EVAR biliac aneurysm repair was admitted on 3/14 for increased weakness s/p unwitnessed fall this morning. The patient was found on floor, denied loss of conscious or head strike. Patient was on aspirin. Patient had pain in his pelvis. Trauma workup revealed CT showing L acetabular fracture with inferior pubic rami fracture, no blush, noted L retroperitoneum hematoma. Initial CBC Hbg 5.9 received 1pRBC/1plt/1FFP on arrival in ED, then further tx'd 2 pRBCs. He was felt to have acute blood loss anemia secondary to pelvic hemorrhage. Treatment for his fracture was considered, but no acute surgical procedure is contemplated. On 3/16 he was reported with bacteremia. Concern of an infectious process was raised.    1. Bacteremia with CNST. Pelvic hemorrhage. Episode of hypotension. Anemia. Left hip acetabular fracture.  -cultures reviewed  -one BC bottle is showing CNST --> likely contaminant  -no clinical signs of sepsis  -would hold off further abx therapy at this time  -ortho evaluation appreciated  -repeat BC x 2 collected  -old chart reviewed to assess prior cultures  -f/u cultures  -monitor temps  -f/u CBC  -supportive care  2. Other issues:   -care per medicine    IV to PO abx token dose not apply

## 2024-03-17 NOTE — CONSULT NOTE ADULT - SUBJECTIVE AND OBJECTIVE BOX
Patient is a 89y old  Male who presents with a chief complaint of unwitnessed fall    HPI:  89 y/o Male with h/o CAD s/p CABG and EVAR biliac aneurysm repair was admitted on 3/14 for increased weakness s/p unwitnessed fall this morning. The patient was found on floor, denied loss of conscious or head strike. Patient was on aspirin. Patient had pain in his pelvis. Trauma workup revealed CT showing L acetabular fracture with inferior pubic rami fracture, no blush, noted L retroperitoneum hematoma. Initial CBC Hbg 5.9 received 1pRBC/1plt/1FFP on arrival in ED, then further tx'd 2 pRBCs. He was felt to have acute blood loss anemia secondary to pelvic hemorrhage. Treatment for his fracture was considered, but no acute surgical procedure is contemplated. On 3/16 he was reported with bacteremia. Concern of an infectious process was raised.      PMH: as above  PSH: as above  Meds: per reconciliation sheet, noted below  MEDICATIONS  (STANDING):  aspirin  chewable 81 milliGRAM(s) Oral daily  atorvastatin 40 milliGRAM(s) Oral at bedtime  gabapentin 100 milliGRAM(s) Oral three times a day  lidocaine   4% Patch 1 Patch Transdermal daily  metoprolol tartrate 12.5 milliGRAM(s) Oral daily  potassium phosphate / sodium phosphate Powder (PHOS-NaK) 1 Packet(s) Oral once  vancomycin  IVPB 1000 milliGRAM(s) IV Intermittent every 24 hours    MEDICATIONS  (PRN):  acetaminophen   IVPB .. 1000 milliGRAM(s) IV Intermittent once PRN Temp greater or equal to 38C (100.4F), Mild Pain (1 - 3)  HYDROmorphone  Injectable 0.5 milliGRAM(s) IV Push every 4 hours PRN Severe Pain (7 - 10)  ondansetron Injectable 4 milliGRAM(s) IV Push every 6 hours PRN Nausea    Allergies    No Known Allergies    Intolerances      Social: no smoking, no alcohol, no illegal drugs; no recent travel, no exposure to TB  FAMILY HISTORY:    no history of premature cardiovascular disease in first degree relatives    ROS: the patient denies fever, no chills, no HA, no seizures, no dizziness, no sore throat, no nasal congestion, no blurry vision, no CP, no palpitations, no SOB, no cough, no abdominal pain, no diarrhea, no N/V, no dysuria, no leg pain, no claudication, no rash, no joint aches, no rectal pain or bleeding, no night sweats  All other systems reviewed and are negative    Vital Signs Last 24 Hrs  T(C): 36.9 (17 Mar 2024 08:21), Max: 36.9 (17 Mar 2024 08:21)  T(F): 98.4 (17 Mar 2024 08:21), Max: 98.4 (17 Mar 2024 08:21)  HR: 61 (17 Mar 2024 09:00) (61 - 64)  BP: 110/64 (17 Mar 2024 09:00) (104/63 - 110/64)  BP(mean): --  RR: 17 (17 Mar 2024 09:00) (17 - 19)  SpO2: 96% (17 Mar 2024 09:00) (92% - 96%)    Parameters below as of 17 Mar 2024 09:00  Patient On (Oxygen Delivery Method): room air    PE:    Constitutional:  No acute distress  HEENT: NC/AT, EOMI, PERRLA, conjunctivae clear; ears and nose atraumatic; pharynx benign  Neck: supple; thyroid not palpable  Back: no tenderness  Respiratory: respiratory effort normal; clear to auscultation  Cardiovascular: S1S2 regular, no murmurs  Abdomen: soft, not tender, not distended, positive BS; no liver or spleen organomegaly  Genitourinary: no suprapubic tenderness  Lymphatic: no LN palpable  Musculoskeletal: no muscle tenderness, no joint swelling or tenderness  Has left hip pain  Extremities: no pedal edema  Neurological/ Psychiatric: Alert, judgement and insight impaired; moving all extremities  Skin: no rashes; no palpable lesions    Labs: all available labs reviewed                        8.4    5.65  )-----------( 105      ( 17 Mar 2024 06:05 )             26.2     03-17    144  |  114<H>  |  31<H>  ----------------------------<  98  3.7   |  25  |  1.02    Ca    8.4<L>      17 Mar 2024 06:05  Phos  1.6     03-17  Mg     2.5     03-17    Culture - Urine (collected 14 Mar 2024 16:25)  Source: Clean Catch None  Final Report (15 Mar 2024 23:56):    No growth    Culture - Blood (collected 14 Mar 2024 15:40)  Source: .Blood None  Preliminary Report (16 Mar 2024 23:02):    No growth at 48 Hours    Culture - Blood (collected 14 Mar 2024 15:08)  Source: .Blood Blood-Venous  Gram Stain (16 Mar 2024 22:34):    Growth in anaerobic bottle: Gram positive cocci in pairs  Preliminary Report (17 Mar 2024 13:54):    Growth in anaerobic bottle: Staphylococcus capitis    Isolation of Coagulase negative Staphylococcus from single blood culture    sets may represent    contamination. Contact the Microbiology Department at [phone] if    susceptibility testing is    clinically indicated.    Direct identification is available within approximately 3-5    hours either by Blood Panel Multiplexed PCR or Direct    MALDI-TOF. Details: [email or web address]  Organism: Blood Culture PCR (17 Mar 2024 00:28)  Organism: Blood Culture PCR (17 Mar 2024 00:28)      -  Coagulase negative Staphylococcus: Detec      Method Type: PCR    Radiology: all available radiological tests reviewed    < from: Xray Pelvis AP only (03.15.24 @ 09:30) >  IMPRESSION:  Comminuted impacted fracture of the left acetabulum with protrusion of the femoral head medially into the pelvis.  No definite fracture of the left femur were knee.    There is questionable linear lucency in the distal fibula however   overlying bandages and splint limiting evaluation. Recommend dedicated films without the splint when the patient is able.  < end of copied text >      Advanced directives addressed: full resuscitation

## 2024-03-17 NOTE — CONSULT NOTE ADULT - CONSULT REASON
bacteremia
L hip pain, trauma pt  time consulted: 1730  time seen: 1740
med managment
s/p fall with pelvic fracture and hematoma
GOC
elevated troponin

## 2024-03-17 NOTE — PROGRESS NOTE ADULT - SUBJECTIVE AND OBJECTIVE BOX
SURGERY DAILY PROGRESS NOTE:     Subjective:  Patient seen and examined this AM at bedside. No acute events overnight and patient resting comfortably. Follows commands, tolerating diet, voided after bhatt removal. Denies fever/chills, shortness of breath, chest pain. VS reviewed    Objective:    MEDICATIONS  (STANDING):  aspirin  chewable 81 milliGRAM(s) Oral daily  atorvastatin 40 milliGRAM(s) Oral at bedtime  gabapentin 100 milliGRAM(s) Oral three times a day  lidocaine   4% Patch 1 Patch Transdermal daily  metoprolol tartrate 12.5 milliGRAM(s) Oral daily  vancomycin  IVPB 1000 milliGRAM(s) IV Intermittent every 24 hours    MEDICATIONS  (PRN):  acetaminophen   IVPB .. 1000 milliGRAM(s) IV Intermittent once PRN Temp greater or equal to 38C (100.4F), Mild Pain (1 - 3)  HYDROmorphone  Injectable 0.5 milliGRAM(s) IV Push every 4 hours PRN Severe Pain (7 - 10)  ondansetron Injectable 4 milliGRAM(s) IV Push every 6 hours PRN Nausea      Vital Signs Last 24 Hrs  T(C): 36.7 (16 Mar 2024 21:00), Max: 37 (16 Mar 2024 09:00)  T(F): 98 (16 Mar 2024 21:00), Max: 98.6 (16 Mar 2024 09:00)  HR: 64 (16 Mar 2024 21:00) (62 - 72)  BP: 108/64 (16 Mar 2024 21:00) (104/63 - 131/66)  BP(mean): 82 (16 Mar 2024 10:00) (82 - 85)  RR: 18 (16 Mar 2024 21:00) (18 - 24)  SpO2: 96% (16 Mar 2024 21:00) (92% - 98%)    Parameters below as of 16 Mar 2024 21:00  Patient On (Oxygen Delivery Method): room air          PHYSICAL EXAMS  GCS of 15  Airway is patent  Breathing is symmetric and unlabored  Neuro: AA ox2 intermittently, dementia at baseline, moving all 4, LLE in splint/cast  Psych: normal affect  HEENT: Normocephalic, atraumatic, BONITA, EOM wnl, no otorrhea or hemotympanum b/l, no epistaxis or d/c b/l nares, no craniofacial bony pathology or tenderness b/l  Neck: . No crepitus, no ecchymosis, no hematoma, to exam, , no tracheal deviation  Cspine/thoracolumbrosacral spine: no gross bony pathology or tenderness to exam  Cardiovascular: S1S2 Present  Chest: no gross rib pathology or tenderness to exam. No sternal pathology or tenderness to exam. No crepitus, no ecchymosis, no hematoma. No penetrating thorcoabdominal trauma  Respiratory: Rate is 18; Respiratory Effort normal; no wheezes, rales or rhonchi to exam  ABD: bowel sounds (+), soft, LLQ tenderness, non distended, no rebound, no guarding, no rigidity, no skin changes to exam. L hip tenderness   Genitourinary: No scrotal/perineal/perirectal hematoma/ecchymosis/tenderness to exam  External genitalia: normal, no blood at urethral meatus. Bhatt in situ   Musculoskeletal: L leg with acewrap in traction, limited ROM.  Pt has palpable b/l radial, femoral, dorsalis pedis pulses. All digits are warm and well perfused.  Pt demonstrates grossly intact sensorimotor function. Pt has good capillary refill to digits, no calf edema or tenderness to exam.  Skin: multiple ecchymosis on b/l UE. Superficial abrasion at R elbow      I&O's Detail    16 Mar 2024 07:01  -  17 Mar 2024 07:00  --------------------------------------------------------  IN:  Total IN: 0 mL    OUT:    Indwelling Catheter - Urethral (mL): 260 mL  Total OUT: 260 mL    Total NET: -260 mL          Daily     Daily     LABS:                        8.4    5.65  )-----------( 105      ( 17 Mar 2024 06:05 )             26.2     03-17    144  |  114<H>  |  31<H>  ----------------------------<  98  3.7   |  25  |  1.02    Ca    8.4<L>      17 Mar 2024 06:05  Phos  1.6     03-17  Mg     2.5     03-17      PT/INR - ( 15 Mar 2024 13:17 )   PT: 12.6 sec;   INR: 1.12 ratio         PTT - ( 15 Mar 2024 13:17 )  PTT:24.6 sec  Urinalysis Basic - ( 17 Mar 2024 06:05 )    Color: x / Appearance: x / SG: x / pH: x  Gluc: 98 mg/dL / Ketone: x  / Bili: x / Urobili: x   Blood: x / Protein: x / Nitrite: x   Leuk Esterase: x / RBC: x / WBC x   Sq Epi: x / Non Sq Epi: x / Bacteria: x        RADIOLOGY & ADDITIONAL STUDIES:         SURGERY DAILY PROGRESS NOTE:   CC: Patient is a 89y old  Male who presents with a chief complaint of unwitnessed fall (17 Mar 2024 14:52)    Subjective:  Patient seen and examined this AM at bedside. No acute events overnight and patient resting comfortably. Follows commands, tolerating diet, voided after bhatt removal. Denies fever/chills, shortness of breath, chest pain. VS reviewed    ROS: as above otherwise negative    MEDICATIONS  (STANDING):  aspirin  chewable 81 milliGRAM(s) Oral daily  atorvastatin 40 milliGRAM(s) Oral at bedtime  gabapentin 100 milliGRAM(s) Oral three times a day  lidocaine   4% Patch 1 Patch Transdermal daily  metoprolol tartrate 12.5 milliGRAM(s) Oral daily  vancomycin  IVPB 1000 milliGRAM(s) IV Intermittent every 24 hours    MEDICATIONS  (PRN):  acetaminophen   IVPB .. 1000 milliGRAM(s) IV Intermittent once PRN Temp greater or equal to 38C (100.4F), Mild Pain (1 - 3)  HYDROmorphone  Injectable 0.5 milliGRAM(s) IV Push every 4 hours PRN Severe Pain (7 - 10)  ondansetron Injectable 4 milliGRAM(s) IV Push every 6 hours PRN Nausea      Vital Signs Last 24 Hrs  T(C): 36.7 (16 Mar 2024 21:00), Max: 37 (16 Mar 2024 09:00)  T(F): 98 (16 Mar 2024 21:00), Max: 98.6 (16 Mar 2024 09:00)  HR: 64 (16 Mar 2024 21:00) (62 - 72)  BP: 108/64 (16 Mar 2024 21:00) (104/63 - 131/66)  BP(mean): 82 (16 Mar 2024 10:00) (82 - 85)  RR: 18 (16 Mar 2024 21:00) (18 - 24)  SpO2: 96% (16 Mar 2024 21:00) (92% - 98%)    Parameters below as of 16 Mar 2024 21:00  Patient On (Oxygen Delivery Method): room air          PHYSICAL EXAMS  GCS of 15  Airway is patent  Breathing is symmetric and unlabored  Neuro: AA ox2 intermittently, dementia at baseline, moving all 4, LLE in splint/cast  Psych: normal affect  HEENT: Normocephalic, atraumatic, BONITA, EOM wnl, no otorrhea or hemotympanum b/l, no epistaxis or d/c b/l nares, no craniofacial bony pathology or tenderness b/l  Neck: . No crepitus, no ecchymosis, no hematoma, to exam, , no tracheal deviation  Cspine/thoracolumbrosacral spine: no gross bony pathology or tenderness to exam  Cardiovascular: S1S2 Present  Chest: no gross rib pathology or tenderness to exam. No sternal pathology or tenderness to exam. No crepitus, no ecchymosis, no hematoma. No penetrating thorcoabdominal trauma  Respiratory: Rate is 18; Respiratory Effort normal; no wheezes, rales or rhonchi to exam  ABD: bowel sounds (+), soft, LLQ tenderness, non distended, no rebound, no guarding, no rigidity, no skin changes to exam. L hip tenderness   Genitourinary: No scrotal/perineal/perirectal hematoma/ecchymosis/tenderness to exam  External genitalia: normal, no blood at urethral meatus. Bhatt in situ   Musculoskeletal: L leg with acewrap, limited ROM.  Pt has palpable b/l radial, femoral, dorsalis pedis pulses. All digits are warm and well perfused.  Pt demonstrates grossly intact sensorimotor function to limited exam. Pt has good capillary refill to digits, no calf edema or tenderness to exam.  Skin: multiple ecchymosis on b/l UE. Superficial abrasion at R elbow      I&O's Detail    16 Mar 2024 07:01  -  17 Mar 2024 07:00  --------------------------------------------------------  IN:  Total IN: 0 mL    OUT:    Indwelling Catheter - Urethral (mL): 260 mL  Total OUT: 260 mL    Total NET: -260 mL          Daily     Daily     LABS:                        8.4    5.65  )-----------( 105      ( 17 Mar 2024 06:05 )             26.2     03-17    144  |  114<H>  |  31<H>  ----------------------------<  98  3.7   |  25  |  1.02    Ca    8.4<L>      17 Mar 2024 06:05  Phos  1.6     03-17  Mg     2.5     03-17      PT/INR - ( 15 Mar 2024 13:17 )   PT: 12.6 sec;   INR: 1.12 ratio         PTT - ( 15 Mar 2024 13:17 )  PTT:24.6 sec  Urinalysis Basic - ( 17 Mar 2024 06:05 )    Color: x / Appearance: x / SG: x / pH: x  Gluc: 98 mg/dL / Ketone: x  / Bili: x / Urobili: x   Blood: x / Protein: x / Nitrite: x   Leuk Esterase: x / RBC: x / WBC x   Sq Epi: x / Non Sq Epi: x / Bacteria: x        RADIOLOGY & ADDITIONAL STUDIES:

## 2024-03-17 NOTE — PROGRESS NOTE ADULT - SUBJECTIVE AND OBJECTIVE BOX
HPI:  3/14/24:  Mr. Bravo is a 89 yo male with a hx CAD s/p CABG, PAD s/p EVAR iliac aneurysm repair presenting after an unwitnessed fall. Pt was found on floor; no reported LOC or head strike.   Currently confused and unable to provide additional information. He denies chest pain, SOB.     In the ED, he was hypotensive 92% on room air.   Labs notable for  hgb 5.9, WBC 10.75, plt 103, Cr 2.35, Hs trop 393, 1079, 3223.   ECG NSR nonspecific ST-T wave abnormality.   CTAP showed Markedly comminuted left acetabular/left iliac bone fracture,   Moderate amount of hemorrhage in the left retroperitoneum,   left pelvis and left iliopsoas muscle, Nondisplaced fracture of the left inferior pubic ramus.    Received 2 units prbc.   3/16/'24: no cardiac complaints. he reports his PCP & cardiologist is Dr. Miguel Massey.    MEDICATIONS:  OUTPATIENT  Home Medications:  aspirin 81 mg oral tablet: orally once a day (14 Mar 2024 18:40)  atorvastatin 40 mg oral tablet: 1 tab(s) orally once a day (14 Mar 2024 18:39)  CoQ10 100 mg oral capsule: 1 cap(s) orally once a day (14 Mar 2024 18:40)  DULoxetine 30 mg oral delayed release capsule: 1 cap(s) orally once a day (14 Mar 2024 18:39)  losartan 25 mg oral tablet: 1 tab(s) orally once a day (14 Mar 2024 18:39)  naproxen 250 mg oral tablet: 1 tab(s) orally 2 times a day (14 Mar 2024 18:41)  Ocuvite oral tablet: 1 tab(s) orally once a day (14 Mar 2024 18:41)  Vitamin D3 25 mcg (1000 intl units) oral tablet: 1 tab(s) orally once a day (14 Mar 2024 18:41)    MEDICATIONS  (STANDING):  aspirin  chewable 81 milliGRAM(s) Oral daily  atorvastatin 40 milliGRAM(s) Oral at bedtime  gabapentin 100 milliGRAM(s) Oral three times a day  lidocaine   4% Patch 1 Patch Transdermal daily  metoprolol tartrate 12.5 milliGRAM(s) Oral daily  potassium phosphate / sodium phosphate Powder (PHOS-NaK) 1 Packet(s) Oral once  potassium phosphate / sodium phosphate Powder (PHOS-NaK) 1 Packet(s) Oral once  vancomycin  IVPB 1000 milliGRAM(s) IV Intermittent every 24 hours    MEDICATIONS  (PRN):  acetaminophen   IVPB .. 1000 milliGRAM(s) IV Intermittent once PRN Temp greater or equal to 38C (100.4F), Mild Pain (1 - 3)  HYDROmorphone  Injectable 0.5 milliGRAM(s) IV Push every 4 hours PRN Severe Pain (7 - 10)  ondansetron Injectable 4 milliGRAM(s) IV Push every 6 hours PRN Nausea    Vital Signs Last 24 Hrs  T(C): 36.9 (17 Mar 2024 08:21), Max: 36.9 (17 Mar 2024 08:21)  T(F): 98.4 (17 Mar 2024 08:21), Max: 98.4 (17 Mar 2024 08:21)  HR: 61 (17 Mar 2024 09:00) (61 - 71)  BP: 110/64 (17 Mar 2024 09:00) (104/63 - 131/63)  BP(mean): 82 (16 Mar 2024 10:00) (82 - 82)  RR: 17 (17 Mar 2024 09:00) (17 - 22)  SpO2: 96% (17 Mar 2024 09:00) (92% - 98%)    Parameters below as of 17 Mar 2024 09:00  Patient On (Oxygen Delivery Method): room air    PHYSICAL EXAM:    PHYSICAL EXAM:  Constitutional: NAD, awake and alert but confused - although remembers my father Dr. Miguel Massey and hearing about me throughout the years  HEENT: no JVD  Pulmonary: Non-labored, breath sounds are clear bilaterally, No wheezing, rales or rhonchi  Cardiovascular: RRR, III/VI NAMITA at RUSB and soft systolic murmur at apex  Gastrointestinal:  soft, nontender. +BS  Ext: no edema  Neurological: AAAOx2 - see above about knowing Dr. Miguel Massey - has issues with more short term memory - does not remember he is in the hospital and have to frequently re-orient him to this  Skin: No rashes.     LABS:                                  8.4    5.65  )-----------( 105      ( 17 Mar 2024 06:05 )             26.2                8.0    7.33  )-----------( 104      ( 16 Mar 2024 05:38 )             23.9                         8.4    8.71  )-----------( 107      ( 15 Mar 2024 16:53 )             25.5                         8.5    9.83  )-----------( 110      ( 15 Mar 2024 13:17 )             26.2     03-17    144  |  114<H>  |  31<H>  ----------------------------<  98  3.7   |  25  |  1.02    Ca    8.4<L>      17 Mar 2024 06:05  Phos  1.6     03-17  Mg     2.5     03-17      16 Mar 2024 05:38    144    |  112    |  46     ----------------------------<  83     3.4     |  24     |  1.37   15 Mar 2024 05:47    141    |  111    |  57     ----------------------------<  115    4.1     |  23     |  2.17   14 Mar 2024 15:40    140    |  110    |  53     ----------------------------<  109    4.7     |  21     |  2.35     Ca    8.3        16 Mar 2024 05:38  Ca    7.9        15 Mar 2024 05:47  Ca    8.6        14 Mar 2024 15:40  Phos  2.9       16 Mar 2024 05:38  Phos  5.0       15 Mar 2024 05:47  Phos  5.6       14 Mar 2024 15:40  Mg     2.5       16 Mar 2024 05:38  Mg     2.5       15 Mar 2024 05:47  Mg     2.4       14 Mar 2024 15:40    TPro  5.8    /  Alb  3.1    /  TBili  1.7    /  DBili  x      /  AST  85     /  ALT  46     /  AlkPhos  98     15 Mar 2024 05:47  TPro  5.6    /  Alb  3.2    /  TBili  1.7    /  DBili  x      /  AST  74     /  ALT  46     /  AlkPhos  104    14 Mar 2024 15:40    PT/INR - ( 15 Mar 2024 13:17 )   PT: 12.6 sec;   INR: 1.12 ratio         PTT - ( 15 Mar 2024 13:17 )  PTT:24.6 sec  ===============================  ===============================  CARDIAC BIOMARKERS:  BNP      TROPONIN  Troponin I, High Sensitivity Result: 2768.60 ng/L *H* (03-16-24 @ 05:38)  Troponin I, High Sensitivity Result: 3223.96 ng/L *H* (03-15-24 @ 05:47)  Troponin I, High Sensitivity Result: 1079.88 ng/L *H* (03-14-24 @ 19:43)  Troponin I, High Sensitivity Result: 393.80 ng/L *H* (03-14-24 @ 15:40)    ===============================  ===============================  EKG:    < from: 12 Lead ECG (03.14.24 @ 15:05) >    Diagnosis Line Normal sinus rhythm  Nonspecific ST and T wave abnormality  Prolonged QT  Abnormal ECG  No previous ECGs available  Confirmed by MD BERNSTEIN PAUL ([id number]) on 3/15/2024 1:06:00 PM    < end of copied text >    ===============================  ECHO:  pt refused echo    ===============================

## 2024-03-17 NOTE — CONSULT NOTE ADULT - SUBJECTIVE AND OBJECTIVE BOX
89 year old male who fell last night and then was found down today    PMH CAD, endovascular repairs           no known kidney  disease, no heart failure known  In ED. Patient had MTP called, got 1, 1, 1, hgb 5.9  was on aspirin at home  CT showed left acetabular fracture, with inferior rami fracture, no blush on CTA hgb 5.9 got 3 units of blood  Downgraded from icu  Hospitalist consulted  89 year old male who fell last night and then was found down today    PMH CAD, endovascular repairs           no known kidney  disease, no heart failure known  In ED. Patient had MTP called, got 1, 1, 1, hgb 5.9  was on aspirin at home  CT showed left acetabular fracture, with inferior rami fracture, no blush on CTA hgb 5.9 got 3 units of blood  Downgraded from icu  Hospitalist consulted      ICU Vital Signs Last 24 Hrs  T(C): 36.9 (17 Mar 2024 08:21), Max: 36.9 (17 Mar 2024 08:21)  T(F): 98.4 (17 Mar 2024 08:21), Max: 98.4 (17 Mar 2024 08:21)  HR: 61 (17 Mar 2024 09:00) (61 - 64)  BP: 110/64 (17 Mar 2024 09:00) (104/63 - 110/64)  BP(mean): --  ABP: --  ABP(mean): --  RR: 17 (17 Mar 2024 09:00) (17 - 19)  SpO2: 96% (17 Mar 2024 09:00) (92% - 96%)    O2 Parameters below as of 17 Mar 2024 09:00  Patient On (Oxygen Delivery Method): room air            PHYSICAL EXAM:    Constitutional: NAD, awake and alert  HEENT: PERR, EOMI, Normal Hearing, MMM  Neck: Soft and supple, No LAD, No JVD  Respiratory: Breath sounds are clear bilaterally, No wheezing, rales or rhonchi  Cardiovascular: S1 and S2, regular rate and rhythm, no Murmurs, gallops or rubs  Gastrointestinal: Bowel Sounds present, soft, nontender, nondistended, no guarding, no rebound  Extremities: No peripheral edema  Vascular: 2+ peripheral pulses  Neurological: A/O x 3, no focal deficits  Musculoskeletal: 5/5 strength b/l upper and lower extremities  Skin: No rashes; midine scar      CBC:            8.4    5.65  )-----------( 105      ( 03-17-24 @ 06:05 )             26.2         Chem:         ( 03-17-24 @ 06:05 )    144  |  114<H>  |  31<H>  ----------------------------<  98  3.7   |  25  |  1.02        Liver Functions:     Type & Screen:

## 2024-03-17 NOTE — PROGRESS NOTE ADULT - ASSESSMENT
89yo M presents s/p unwitnessed fall with comminuted left acetabular/pelvic fracture with moderate hematoma in left retroperitoneum/pelvis/iliopsoas muscle    Per family discussion, discussed with daughter, family agrees with conservative treatment, but would not want any surgical intervention. verbal DNR/DNI    Plan:  -multimodal pain control prn  - Trend troponins (trending down), CBC. transfuse prn   - monitor VS  - cardiology recs appreciated   - echo tomorrow  - cont GOC discussion with family  - continue IVF  - orthopedic surgery recs appreciated   - SCD for DVT PPX, hold pharmaceutical ppx and aspirin    Plan discussed with trauma surgery team and attending,

## 2024-03-18 LAB
ANION GAP SERPL CALC-SCNC: 5 MMOL/L — SIGNIFICANT CHANGE UP (ref 5–17)
BUN SERPL-MCNC: 28 MG/DL — HIGH (ref 7–23)
CALCIUM SERPL-MCNC: 8.6 MG/DL — SIGNIFICANT CHANGE UP (ref 8.5–10.1)
CHLORIDE SERPL-SCNC: 113 MMOL/L — HIGH (ref 96–108)
CO2 SERPL-SCNC: 26 MMOL/L — SIGNIFICANT CHANGE UP (ref 22–31)
CREAT SERPL-MCNC: 1.13 MG/DL — SIGNIFICANT CHANGE UP (ref 0.5–1.3)
EGFR: 61 ML/MIN/1.73M2 — SIGNIFICANT CHANGE UP
GLUCOSE SERPL-MCNC: 111 MG/DL — HIGH (ref 70–99)
GRAM STN FLD: ABNORMAL
GRAM STN FLD: ABNORMAL
HCT VFR BLD CALC: 28.1 % — LOW (ref 39–50)
HGB BLD-MCNC: 9.1 G/DL — LOW (ref 13–17)
MAGNESIUM SERPL-MCNC: 2.3 MG/DL — SIGNIFICANT CHANGE UP (ref 1.6–2.6)
MCHC RBC-ENTMCNC: 27.1 PG — SIGNIFICANT CHANGE UP (ref 27–34)
MCHC RBC-ENTMCNC: 32.4 GM/DL — SIGNIFICANT CHANGE UP (ref 32–36)
MCV RBC AUTO: 83.6 FL — SIGNIFICANT CHANGE UP (ref 80–100)
PHOSPHATE SERPL-MCNC: 2.3 MG/DL — LOW (ref 2.5–4.5)
PLATELET # BLD AUTO: 122 K/UL — LOW (ref 150–400)
POTASSIUM SERPL-MCNC: 3.6 MMOL/L — SIGNIFICANT CHANGE UP (ref 3.5–5.3)
POTASSIUM SERPL-SCNC: 3.6 MMOL/L — SIGNIFICANT CHANGE UP (ref 3.5–5.3)
RBC # BLD: 3.36 M/UL — LOW (ref 4.2–5.8)
RBC # FLD: 17.8 % — HIGH (ref 10.3–14.5)
SODIUM SERPL-SCNC: 144 MMOL/L — SIGNIFICANT CHANGE UP (ref 135–145)
SPECIMEN SOURCE: SIGNIFICANT CHANGE UP
WBC # BLD: 5.63 K/UL — SIGNIFICANT CHANGE UP (ref 3.8–10.5)
WBC # FLD AUTO: 5.63 K/UL — SIGNIFICANT CHANGE UP (ref 3.8–10.5)

## 2024-03-18 PROCEDURE — 99232 SBSQ HOSP IP/OBS MODERATE 35: CPT

## 2024-03-18 PROCEDURE — 99497 ADVNCD CARE PLAN 30 MIN: CPT

## 2024-03-18 PROCEDURE — 99233 SBSQ HOSP IP/OBS HIGH 50: CPT

## 2024-03-18 PROCEDURE — 93306 TTE W/DOPPLER COMPLETE: CPT | Mod: 26

## 2024-03-18 RX ORDER — LISINOPRIL 2.5 MG/1
2.5 TABLET ORAL DAILY
Refills: 0 | Status: DISCONTINUED | OUTPATIENT
Start: 2024-03-18 | End: 2024-03-19

## 2024-03-18 RX ORDER — METOPROLOL TARTRATE 50 MG
12.5 TABLET ORAL DAILY
Refills: 0 | Status: DISCONTINUED | OUTPATIENT
Start: 2024-03-18 | End: 2024-03-18

## 2024-03-18 RX ORDER — SODIUM,POTASSIUM PHOSPHATES 278-250MG
1 POWDER IN PACKET (EA) ORAL ONCE
Refills: 0 | Status: COMPLETED | OUTPATIENT
Start: 2024-03-18 | End: 2024-03-18

## 2024-03-18 RX ORDER — HEPARIN SODIUM 5000 [USP'U]/ML
5000 INJECTION INTRAVENOUS; SUBCUTANEOUS EVERY 12 HOURS
Refills: 0 | Status: DISCONTINUED | OUTPATIENT
Start: 2024-03-18 | End: 2024-03-21

## 2024-03-18 RX ORDER — METOPROLOL TARTRATE 50 MG
25 TABLET ORAL DAILY
Refills: 0 | Status: DISCONTINUED | OUTPATIENT
Start: 2024-03-18 | End: 2024-03-19

## 2024-03-18 RX ORDER — VANCOMYCIN HCL 1 G
750 VIAL (EA) INTRAVENOUS EVERY 12 HOURS
Refills: 0 | Status: DISCONTINUED | OUTPATIENT
Start: 2024-03-18 | End: 2024-03-21

## 2024-03-18 RX ADMIN — GABAPENTIN 100 MILLIGRAM(S): 400 CAPSULE ORAL at 05:21

## 2024-03-18 RX ADMIN — LIDOCAINE 1 PATCH: 4 CREAM TOPICAL at 17:44

## 2024-03-18 RX ADMIN — Medication 81 MILLIGRAM(S): at 10:22

## 2024-03-18 RX ADMIN — Medication 250 MILLIGRAM(S): at 22:13

## 2024-03-18 RX ADMIN — HEPARIN SODIUM 5000 UNIT(S): 5000 INJECTION INTRAVENOUS; SUBCUTANEOUS at 22:13

## 2024-03-18 RX ADMIN — LISINOPRIL 2.5 MILLIGRAM(S): 2.5 TABLET ORAL at 11:20

## 2024-03-18 RX ADMIN — HEPARIN SODIUM 5000 UNIT(S): 5000 INJECTION INTRAVENOUS; SUBCUTANEOUS at 11:22

## 2024-03-18 RX ADMIN — GABAPENTIN 100 MILLIGRAM(S): 400 CAPSULE ORAL at 22:16

## 2024-03-18 RX ADMIN — Medication 1 PACKET(S): at 18:58

## 2024-03-18 RX ADMIN — GABAPENTIN 100 MILLIGRAM(S): 400 CAPSULE ORAL at 13:55

## 2024-03-18 RX ADMIN — ATORVASTATIN CALCIUM 40 MILLIGRAM(S): 80 TABLET, FILM COATED ORAL at 22:13

## 2024-03-18 RX ADMIN — Medication 25 MILLIGRAM(S): at 11:22

## 2024-03-18 RX ADMIN — LIDOCAINE 1 PATCH: 4 CREAM TOPICAL at 10:22

## 2024-03-18 RX ADMIN — LIDOCAINE 1 PATCH: 4 CREAM TOPICAL at 23:00

## 2024-03-18 NOTE — PROVIDER CONTACT NOTE (CRITICAL VALUE NOTIFICATION) - SITUATION
Change of shift. Will report this to oncoming nurse
Troponin 393.80
Blood Cultures collected 3/17- gram+ cocci in clusters

## 2024-03-18 NOTE — PROGRESS NOTE ADULT - SUBJECTIVE AND OBJECTIVE BOX
Date of service: 03-18-24 @ 13:33    Lying in bed in NAD  Lethargic  Reported with new bacteremia  No fever    ROS: no fever or chills; denies dizziness, no HA, no SOB or cough, no abdominal pain, no diarrhea or constipation; no dysuria, no legs pain, no rashes    MEDICATIONS  (STANDING):  aspirin  chewable 81 milliGRAM(s) Oral daily  atorvastatin 40 milliGRAM(s) Oral at bedtime  gabapentin 100 milliGRAM(s) Oral three times a day  heparin   Injectable 5000 Unit(s) SubCutaneous every 12 hours  lidocaine   4% Patch 1 Patch Transdermal daily  lisinopril 2.5 milliGRAM(s) Oral daily  metoprolol succinate ER 25 milliGRAM(s) Oral daily  vancomycin  IVPB 750 milliGRAM(s) IV Intermittent every 12 hours    Vital Signs Last 24 Hrs  T(C): 37.1 (18 Mar 2024 07:23), Max: 37.1 (18 Mar 2024 07:23)  T(F): 98.8 (18 Mar 2024 07:23), Max: 98.8 (18 Mar 2024 07:23)  HR: 76 (18 Mar 2024 07:23) (76 - 78)  BP: 108/74 (18 Mar 2024 07:23) (108/74 - 129/67)  BP(mean): --  RR: 18 (18 Mar 2024 07:23) (16 - 18)  SpO2: 90% (18 Mar 2024 07:23) (90% - 97%)    Parameters below as of 18 Mar 2024 07:23  Patient On (Oxygen Delivery Method): room air     Physical exam:    Constitutional:  No acute distress  HEENT: NC/AT, EOMI, PERRLA, conjunctivae clear; ears and nose atraumatic  Neck: supple; thyroid not palpable  Back: no tenderness  Respiratory: respiratory effort normal; clear to auscultation  Cardiovascular: S1S2 regular, no murmurs  Abdomen: soft, not tender, not distended, positive BS  Genitourinary: no suprapubic tenderness  Lymphatic: no LN palpable  Musculoskeletal: no muscle tenderness, no joint swelling or tenderness  Has left hip pain  Extremities: no pedal edema  Neurological/ Psychiatric: Alert, moving all extremities  Skin: no rashes; no palpable lesions    Labs: all available labs reviewed                        8.4    5.65  )-----------( 105      ( 17 Mar 2024 06:05 )             26.2     03-17    144  |  114<H>  |  31<H>  ----------------------------<  98  3.7   |  25  |  1.02    Ca    8.4<L>      17 Mar 2024 06:05  Phos  1.6     03-17  Mg     2.5     03-17    Culture - Blood (collected 17 Mar 2024 13:03)  Source: .Blood None  Gram Stain (18 Mar 2024 12:51):    Growth in aerobic bottle: Gram Positive Cocci in Clusters  Preliminary Report (18 Mar 2024 12:51):    Growth in aerobic bottle: Gram Positive Cocci in Clusters    Culture - Urine (collected 14 Mar 2024 16:25)  Source: Clean Catch None  Final Report (15 Mar 2024 23:56):    No growth    Culture - Blood (collected 14 Mar 2024 15:40)  Source: .Blood None  Preliminary Report (17 Mar 2024 23:01):    No growth at 72 Hours    Culture - Blood (collected 14 Mar 2024 15:08)  Source: .Blood Blood-Venous  Gram Stain (16 Mar 2024 22:34):    Growth in anaerobic bottle: Gram positive cocci in pairs  Final Report (17 Mar 2024 15:29):    Growth in anaerobic bottle: Staphylococcus capitis    Isolation of Coagulase negative Staphylococcus from single blood culture    sets may represent    contamination. Contact the Microbiology Department at [phone] if    susceptibility testing is    clinically indicated.    Direct identification is available within approximately 3-5    hours either by Blood Panel Multiplexed PCR or Direct    MALDI-TOF. Details: [email or web address]  Organism: Blood Culture PCR (17 Mar 2024 15:29)  Organism: Blood Culture PCR (17 Mar 2024 15:29)      Method Type: PCR      -  Coagulase negative Staphylococcus: Detec      Radiology: all available radiological tests reviewed    < from: Xray Pelvis AP only (03.15.24 @ 09:30) >  IMPRESSION:  Comminuted impacted fracture of the left acetabulum with protrusion of the femoral head medially into the pelvis.  No definite fracture of the left femur were knee.    There is questionable linear lucency in the distal fibula however   overlying bandages and splint limiting evaluation. Recommend dedicated films without the splint when the patient is able.  < end of copied text >      Advanced directives addressed: full resuscitation

## 2024-03-18 NOTE — PROGRESS NOTE ADULT - SUBJECTIVE AND OBJECTIVE BOX
SURGERY DAILY PROGRESS NOTE:   CC: Patient is a 89y old  Male who presents with a chief complaint of unwitnessed fall (17 Mar 2024 14:52)    Subjective:  Patient seen and examined this AM at bedside.   No acute events overnight and patient resting comfortably. Follows commands, tolerating diet.   Denies fever/chills, shortness of breath, chest pain. VS reviewed    ROS: as above otherwise negative      PHYSICAL EXAMS  GCS of 15  AAOx1  Airway is patent  Breathing is symmetric and unlabored  Neuro: AA ox2 intermittently, dementia at baseline, moving all 4, LLE in splint/cast  Psych: normal affect  HEENT: Normocephalic, atraumatic, BONITA, EOM wnl, no otorrhea or hemotympanum b/l, no epistaxis or d/c b/l nares, no craniofacial bony pathology or tenderness b/l  Neck: . No crepitus, no ecchymosis, no hematoma, to exam, , no tracheal deviation  Cspine/thoracolumbrosacral spine: no gross bony pathology or tenderness to exam  Cardiovascular: S1S2 Present  Chest: no gross rib pathology or tenderness to exam. No sternal pathology or tenderness to exam. No crepitus, no ecchymosis, no hematoma. No penetrating thorcoabdominal trauma  Respiratory: Rate is 18; Respiratory Effort normal; no wheezes, rales or rhonchi to exam  ABD: bowel sounds (+), soft, LLQ tenderness, non distended, no rebound, no guarding, no rigidity, no skin changes to exam. L hip tenderness   Genitourinary: No scrotal/perineal/perirectal hematoma/ecchymosis/tenderness to exam  External genitalia: normal, no blood at urethral meatus. Nagel in situ   Musculoskeletal: L leg with acewrap, limited ROM.  Pt has palpable b/l radial, femoral, dorsalis pedis pulses. All digits are warm and well perfused.  Pt demonstrates grossly intact sensorimotor function to limited exam. Pt has good capillary refill to digits, no calf edema or tenderness to exam.  Skin: multiple ecchymosis on b/l UE. Superficial abrasion at R elbow    Vitals:  T(C): 36.6 ( @ 20:57), Max: 36.9 ( @ 08:21)  HR: 78 ( @ 20:57) (61 - 78)  BP: 127/73 ( @ 20:57) (110/64 - 129/67)  RR: 16 ( @ 20:57) (16 - 18)  SpO2: 94% ( @ 20:57) (94% - 97%)     07:01  -   @ 07:00  --------------------------------------------------------  IN:  Total IN: 0 mL    OUT:    Indwelling Catheter - Urethral (mL): 260 mL  Total OUT: 260 mL    Total NET: -260 mL       @ 06:05                    8.4  CBC: 5.65>)-------(<105                     26.2                 144 | 114 | 31    CMP:  ----------------------< 98               3.7 | 25 | 1.02                      Ca:8.4  Phos:1.6  M.5               -|      |-        LFTs:  ------|-|-----             -|      |-      Culture - Urine (collected 24 @ 16:25)  Source: Clean Catch None  Final Report (03-15-24 @ 23:56):    No growth    Culture - Blood (collected 24 @ 15:40)  Source: .Blood None  Preliminary Report (24 @ 23:01):    No growth at 72 Hours    Culture - Blood (collected 24 @ 15:08)  Source: .Blood Blood-Venous  Gram Stain (24 @ 22:34):    Growth in anaerobic bottle: Gram positive cocci in pairs  Final Report (24 @ 15:29):    Growth in anaerobic bottle: Staphylococcus capitis    Isolation of Coagulase negative Staphylococcus from single blood culture    sets may represent    contamination. Contact the Microbiology Department at [phone] if    susceptibility testing is    clinically indicated.    Direct identification is available within approximately 3-5    hours either by Blood Panel Multiplexed PCR or Direct    MALDI-TOF. Details: [email or web address]  Organism: Blood Culture PCR (24 @ 15:29)  Organism: Blood Culture PCR (24 @ 15:29)      -  Coagulase negative Staphylococcus: Detec      Method Type: PCR      Current Inpatient Medications:  acetaminophen   IVPB .. 1000 milliGRAM(s) IV Intermittent once PRN  aspirin  chewable 81 milliGRAM(s) Oral daily  atorvastatin 40 milliGRAM(s) Oral at bedtime  gabapentin 100 milliGRAM(s) Oral three times a day  HYDROmorphone  Injectable 0.5 milliGRAM(s) IV Push every 4 hours PRN  lidocaine   4% Patch 1 Patch Transdermal daily  metoprolol tartrate 12.5 milliGRAM(s) Oral daily  ondansetron Injectable 4 milliGRAM(s) IV Push every 6 hours PRN  vancomycin  IVPB 1000 milliGRAM(s) IV Intermittent every 24 hours    Imaging review:    No new imaging in the last 24 h  SURGERY DAILY PROGRESS NOTE:   CC: Patient is a 89y old  Male who presents with a chief complaint of unwitnessed fall (17 Mar 2024 14:52)    Subjective:  Patient seen and examined this AM at bedside.   No acute events overnight and patient resting comfortably. Follows commands, tolerating diet.   Denies fever/chills, shortness of breath, chest pain. VS reviewed    ROS: as above otherwise negative      PHYSICAL EXAMS  GCS of 15  AAOx1  Airway is patent  Breathing is symmetric and unlabored  Neuro: AA ox2 intermittently, dementia at baseline, moving all 4, LLE in splint/cast  Psych: normal affect  HEENT: Normocephalic, atraumatic, BONITA, EOM wnl, no otorrhea or hemotympanum b/l, no epistaxis or d/c b/l nares,  Neck: . No crepitus, no ecchymosis, no hematoma, to exam, , no tracheal deviation  Cspine/thoracolumbrosacral spine: no gross bony pathology or tenderness to exam  Cardiovascular: S1S2 Present  Chest: no gross rib pathology or tenderness to exam. No sternal pathology or tenderness to exam. No crepitus, no ecchymosis, no hematoma. No penetrating thorcoabdominal trauma  Respiratory: Rate is 18; Respiratory Effort normal; no wheezes, rales or rhonchi to exam  ABD: bowel sounds (+), soft, non distended, no rebound, no guarding, no rigidity, no skin changes to exam. L hip tenderness   Genitourinary: No scrotal/perineal/perirectal hematoma/ecchymosis/tenderness to exam  Musculoskeletal: L leg with acewrap, limited ROM.  Pt has palpable b/l radial, femoral, dorsalis pedis pulses. All digits are warm and well perfused.  Pt demonstrates grossly intact sensorimotor function to limited exam. Pt has good capillary refill to digits, no calf edema or tenderness to exam.  Skin: multiple ecchymosis on b/l UE. Superficial abrasion at R elbow    Vitals:  T(C): 36.6 ( @ 20:57), Max: 36.9 ( @ 08:21)  HR: 78 ( @ 20:57) (61 - 78)  BP: 127/73 ( @ 20:57) (110/64 - 129/67)  RR: 16 ( @ 20:57) (16 - 18)  SpO2: 94% ( @ 20:57) (94% - 97%)     @ 07:01  -   @ 07:00  --------------------------------------------------------  IN:  Total IN: 0 mL    OUT:    Indwelling Catheter - Urethral (mL): 260 mL  Total OUT: 260 mL    Total NET: -260 mL       @ 06:05                    8.4  CBC: 5.65>)-------(<105                     26.2                 144 | 114 | 31    CMP:  ----------------------< 98               3.7 | 25 | 1.02                      Ca:8.4  Phos:1.6  M.5               -|      |-        LFTs:  ------|-|-----             -|      |-      Culture - Urine (collected 24 @ 16:25)  Source: Clean Catch None  Final Report (03-15-24 @ 23:56):    No growth    Culture - Blood (collected 24 @ 15:40)  Source: .Blood None  Preliminary Report (24 @ 23:01):    No growth at 72 Hours    Culture - Blood (collected 24 @ 15:08)  Source: .Blood Blood-Venous  Gram Stain (24 @ 22:34):    Growth in anaerobic bottle: Gram positive cocci in pairs  Final Report (24 @ 15:29):    Growth in anaerobic bottle: Staphylococcus capitis    Isolation of Coagulase negative Staphylococcus from single blood culture    sets may represent    contamination. Contact the Microbiology Department at [phone] if    susceptibility testing is    clinically indicated.    Direct identification is available within approximately 3-5    hours either by Blood Panel Multiplexed PCR or Direct    MALDI-TOF. Details: [email or web address]  Organism: Blood Culture PCR (24 @ 15:29)  Organism: Blood Culture PCR (24 @ 15:29)      -  Coagulase negative Staphylococcus: Detec      Method Type: PCR      Current Inpatient Medications:  acetaminophen   IVPB .. 1000 milliGRAM(s) IV Intermittent once PRN  aspirin  chewable 81 milliGRAM(s) Oral daily  atorvastatin 40 milliGRAM(s) Oral at bedtime  gabapentin 100 milliGRAM(s) Oral three times a day  HYDROmorphone  Injectable 0.5 milliGRAM(s) IV Push every 4 hours PRN  lidocaine   4% Patch 1 Patch Transdermal daily  metoprolol tartrate 12.5 milliGRAM(s) Oral daily  ondansetron Injectable 4 milliGRAM(s) IV Push every 6 hours PRN  vancomycin  IVPB 1000 milliGRAM(s) IV Intermittent every 24 hours    Imaging review:    No new imaging in the last 24 h

## 2024-03-18 NOTE — PROGRESS NOTE ADULT - ASSESSMENT
89yo M presents s/p unwitnessed fall with comminuted left acetabular/pelvic fracture with moderate hematoma in left retroperitoneum/pelvis/iliopsoas muscle    Per family discussion, discussed with daughter, family agrees with conservative treatment, but would not want any surgical intervention. verbal DNR/DNI    Plan:    -multimodal pain control prn  - Trend troponins (trending down), CBC. transfuse prn   - monitor VS  - cardiology recs appreciated, refused echo, will try again in am, TVAR on hold due to pt refusing  - echo tomorrow  - cont GOC discussion with family  - continue IVF  - orthopedic surgery recs appreciated   - SCD for DVT PPX, hold pharmaceutical ppx and aspirin    Plan discussed with trauma surgery team and attending 89yo M presents s/p unwitnessed fall with comminuted left acetabular/pelvic fracture with moderate hematoma in left retroperitoneum/pelvis/iliopsoas muscle    Per family discussion, discussed with daughter, family agrees with conservative treatment, but would not want any surgical intervention. verbal DNR/DNI    Plan:    -multimodal pain control prn  - Trend troponins (trending down), CBC. transfuse prn   - monitor VS  - cardiology recs appreciated, refused echo, will try again in am, TVAR on hold due to pt refusing  - echo tomorrow  - cont GOC discussion with family  - continue IVF  - orthopedic surgery recs appreciated   - SCD for DVT PPX, hold pharmaceutical ppx and aspirin    Plan discussed with trauma surgery team and attending Dr. Collazo    Attending A/P:    Chart reviewed, patient examined, agree with above resident evaluation in addition to the following    Keila resolved, refusing intervention for cardiology/ortho, started on chemical DVT ppx and ASA, will monitor h/h, consult palliative care for GOC and dispo planning      35 minuted of time spent on pt examination, review of relevant labs and radiologic studies, assured stabilization of pt, discussion with relevant services/providers for coordination of pt care and services

## 2024-03-18 NOTE — PROVIDER CONTACT NOTE (CRITICAL VALUE NOTIFICATION) - ACTION/TREATMENT ORDERED:
MD. Milian made aware of results. Patient currently on vanco.
no orders recieved
Antibiotics and ID consult to be ordered.

## 2024-03-18 NOTE — PROGRESS NOTE ADULT - ASSESSMENT
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 The patient was found on floor, denied loss of conscious or head strike. Patient was on aspirin. Patient had pain in his pelvis. Trauma workup revealed CT showing L acetabular fracture with inferior pubic rami fracture, no blush, noted L retroperitoneum hematoma. Initial CBC Hbg 5.9 received 1pRBC/1plt/1FFP on arrival in ED, then further tx'd 2 pRBCs. He was felt to have acute blood loss anemia secondary to pelvic hemorrhage. Treatment for his fracture was considered, but no acute surgical procedure is contemplated. On 3/16 he was reported with bacteremia. Concern of an infectious process was raised.    1. Bacteremia with CNST. Pelvic hemorrhage. Episode of hypotension resolved. Anemia. Left hip acetabular fracture.  -cultures reviewed  -one BC bottle is showing CNST   -repeat BC shows GPC in clusters  -no clinical signs of sepsis  -ortho evaluation appreciated  -start vancomycin 750 mg IV q12h  -repeat BC x 2  -f/u cultures  -monitor temps  -f/u CBC  -supportive care  2. Other issues:   -care per medicine    IV to PO abx token dose not apply

## 2024-03-18 NOTE — PROGRESS NOTE ADULT - SUBJECTIVE AND OBJECTIVE BOX
· Subjective and Objective:    89 year old male who fell last night and then was found down today PMH CAD, endovascular repair, no known kidney disease, no heart failure known  In ED. Patient had MTP called, got 1, 1, 1, hgb 5.9 was on aspirin at home. CT showed left acetabular fracture, with inferior rami fracture, no blush on CTA hgb 5.9 got 3 units of blood;  Downgraded from icu. CT: pelvic, left acetabular fracture resultant RP hematoma, acute blood loss anemia s/p prbc. NSTEMI-> no ishcemic eval, pt and family refused.   ?bacteremia with coag neg stau-> believed to be contaminant.   DNR/I  Hospitalist consulted      ICU Vital Signs Last 24 Hrs  T(C): 37.1 (18 Mar 2024 07:23), Max: 37.1 (18 Mar 2024 07:23)  T(F): 98.8 (18 Mar 2024 07:23), Max: 98.8 (18 Mar 2024 07:23)  HR: 76 (18 Mar 2024 07:23) (61 - 78)  BP: 108/74 (18 Mar 2024 07:23) (108/74 - 129/67)  BP(mean): --  ABP: --  ABP(mean): --  RR: 18 (18 Mar 2024 07:23) (16 - 18)  SpO2: 90% (18 Mar 2024 07:23) (90% - 97%)    O2 Parameters below as of 18 Mar 2024 07:23  Patient On (Oxygen Delivery Method): room air                PHYSICAL EXAM:    Constitutional: NAD, awake and alert  HEENT: PERR, EOMI, Normal Hearing, MMM  Neck: Soft and supple, No LAD, No JVD  Respiratory: Breath sounds are clear bilaterally, No wheezing, rales or rhonchi  Cardiovascular: S1 and S2, regular rate and rhythm, no Murmurs, gallops or rubs  Gastrointestinal: Bowel Sounds present, soft, nontender, nondistended, no guarding, no rebound  Extremities: No peripheral edema  Vascular: 2+ peripheral pulses  Neurological: A/O x 3, no focal deficits  Musculoskeletal: 5/5 strength b/l upper and lower extremities  Skin: No rashes; midine scar      CBC:            8.4    5.65  )-----------( 105      ( 03-17-24 @ 06:05 )             26.2         Chem:         ( 03-17-24 @ 06:05 )    144  |  114<H>  |  31<H>  ----------------------------<  98  3.7   |  25  |  1.02        Liver Functions:     Type & Screen:             A/P:     89 year old male who fell last night and then was found down today PMH CAD, endovascular repair, no known kidney disease, no heart failure known  In ED. Patient had MTP called, got 1, 1, 1, hgb 5.9 was on aspirin at home. CT showed left acetabular fracture, with inferior rami fracture, no blush on CTA hgb 5.9 got 3 units of blood;  Downgraded from icu. CT: pelvic, left acetabular fracture resultant RP hematoma, acute blood loss anemia s/p prbc. NSTEMI-> no ishcemic eval, pt and family refused.   ?bacteremia with coag neg stau-> believed to be contaminant.       #S/p fall s/p L pelvic fracture  #ABLA s/p prbc transfusion  #L RP hematoma/L iliopsoas hematoma  Currently no acute ortho surgery/intervention indicated per attending and orthopaedic traumatologist - family made aware and agrees  - WBAT per ortho  - F/U Dr Nair o/p  - multi modal pain control        #NSTEMI  - pt refusing echo  - cardiology following  - cardiology not pursuing echo at this time as pt continues to refuse therefore no TAVR is planned  - cardiology recc anti-plt therapy + statin once surgery clears   - BB/ ACE-I  - DNR//I      #Coag negative STAU bacteremia/contaminant  - 1 bottle isolated  - no leukocytosis or fevers  - pt non toxic appearing  - doubt real infection and suspect contaminated specimen. Stop iv vanco  - if remains afebrile, recc dc abx  - repeat blood cultures       #BALWINDER monitor,   - got contrast, creatinine improving  - resolved        DC planning per surgery. . PT consult. No other planned interventions per cardiology and orthopedics  Time spent: 60 min  Please reconsult with any new medical or ongoing issues.                    · Subjective and Objective:    89 year old male who fell last night and then was found down today PMH CAD, endovascular repair, no known kidney disease, no heart failure known  In ED. Patient had MTP called, got 1, 1, 1, hgb 5.9 was on aspirin at home. CT showed left acetabular fracture, with inferior rami fracture, no blush on CTA hgb 5.9 got 3 units of blood;  Downgraded from icu. CT: pelvic, left acetabular fracture resultant RP hematoma, acute blood loss anemia s/p prbc. NSTEMI-> no ishcemic eval, pt and family refused.   ?bacteremia with coag neg stau-> believed to be contaminant.   DNR/I  Hospitalist consulted      ICU Vital Signs Last 24 Hrs  T(C): 37.1 (18 Mar 2024 07:23), Max: 37.1 (18 Mar 2024 07:23)  T(F): 98.8 (18 Mar 2024 07:23), Max: 98.8 (18 Mar 2024 07:23)  HR: 76 (18 Mar 2024 07:23) (61 - 78)  BP: 108/74 (18 Mar 2024 07:23) (108/74 - 129/67)  BP(mean): --  ABP: --  ABP(mean): --  RR: 18 (18 Mar 2024 07:23) (16 - 18)  SpO2: 90% (18 Mar 2024 07:23) (90% - 97%)    O2 Parameters below as of 18 Mar 2024 07:23  Patient On (Oxygen Delivery Method): room air                PHYSICAL EXAM:    Constitutional: NAD, awake and alert  HEENT: PERR, EOMI, Normal Hearing, MMM  Neck: Soft and supple, No LAD, No JVD  Respiratory: Breath sounds are clear bilaterally, No wheezing, rales or rhonchi  Cardiovascular: S1 and S2, regular rate and rhythm, no Murmurs, gallops or rubs  Gastrointestinal: Bowel Sounds present, soft, nontender, nondistended, no guarding, no rebound  Extremities: No peripheral edema  Vascular: 2+ peripheral pulses  Neurological: A/O x 3, no focal deficits  Musculoskeletal: 5/5 strength b/l upper and lower extremities  Skin: No rashes; midine scar      CBC:            8.4    5.65  )-----------( 105      ( 03-17-24 @ 06:05 )             26.2         Chem:         ( 03-17-24 @ 06:05 )    144  |  114<H>  |  31<H>  ----------------------------<  98  3.7   |  25  |  1.02        Liver Functions:     Type & Screen:             A/P:     89 year old male who fell last night and then was found down today PMH CAD, endovascular repair, no known kidney disease, no heart failure known  In ED. Patient had MTP called, got 1, 1, 1, hgb 5.9 was on aspirin at home. CT showed left acetabular fracture, with inferior rami fracture, no blush on CTA hgb 5.9 got 3 units of blood;  Downgraded from icu. CT: pelvic, left acetabular fracture resultant RP hematoma, acute blood loss anemia s/p prbc. NSTEMI-> no ishcemic eval, pt and family refused.   ?bacteremia with coag neg stau-> believed to be contaminant.       #S/p fall s/p L pelvic fracture  #ABLA s/p prbc transfusion  #L RP hematoma/L iliopsoas hematoma  Currently no acute ortho surgery/intervention indicated per attending and orthopaedic traumatologist - family made aware and agrees  - WBAT per ortho  - F/U Dr Nair o/p  - multi modal pain control        #NSTEMI  - pt refusing echo  - cardiology following  - cardiology not pursuing echo at this time as pt continues to refuse therefore no TAVR is planned  - cardiology recc anti-plt therapy + statin once surgery clears   - BB/ ACE-I  - DNR//I      #Coag negative STAU bacteremia  - repeat cultures: coag negative Staph aureus-> ID recc iv vanco  - repeat cultures in am  - no leukocytosis or fevers  - pt non toxic appearing  - D/W ID      #BALWINDER monitor,   - got contrast, creatinine improving  - resolved      No other planned interventions per cardiology and orthopedics  I spoke with dtr in detail regarding pursual of ischemic eval and or use of life vest given new EF 30%. Dtr states father stated he wanted no heroic measures carried out and at this juncture she does not wish to pursue any of this however she will confirm with her other 2 siblings (pt has mild- mod dementia) and wishes for palliative consult. I have d/w ID and palliative care team  Pall team will reach out for family meeting  ID recc: repeat cultures and resume iv vanco for now

## 2024-03-19 LAB
ANION GAP SERPL CALC-SCNC: 7 MMOL/L — SIGNIFICANT CHANGE UP (ref 5–17)
BASOPHILS # BLD AUTO: 0.03 K/UL — SIGNIFICANT CHANGE UP (ref 0–0.2)
BASOPHILS NFR BLD AUTO: 0.5 % — SIGNIFICANT CHANGE UP (ref 0–2)
BUN SERPL-MCNC: 28 MG/DL — HIGH (ref 7–23)
CALCIUM SERPL-MCNC: 8.5 MG/DL — SIGNIFICANT CHANGE UP (ref 8.5–10.1)
CHLORIDE SERPL-SCNC: 114 MMOL/L — HIGH (ref 96–108)
CO2 SERPL-SCNC: 24 MMOL/L — SIGNIFICANT CHANGE UP (ref 22–31)
CREAT SERPL-MCNC: 1.04 MG/DL — SIGNIFICANT CHANGE UP (ref 0.5–1.3)
CULTURE RESULTS: SIGNIFICANT CHANGE UP
EGFR: 67 ML/MIN/1.73M2 — SIGNIFICANT CHANGE UP
EOSINOPHIL # BLD AUTO: 0.29 K/UL — SIGNIFICANT CHANGE UP (ref 0–0.5)
EOSINOPHIL NFR BLD AUTO: 4.6 % — SIGNIFICANT CHANGE UP (ref 0–6)
FIBRINOGEN AG PPP IA-MCNC: 264 MG/DL — SIGNIFICANT CHANGE UP (ref 233–496)
GLUCOSE SERPL-MCNC: 100 MG/DL — HIGH (ref 70–99)
HCT VFR BLD CALC: 29.1 % — LOW (ref 39–50)
HGB BLD-MCNC: 9 G/DL — LOW (ref 13–17)
IMM GRANULOCYTES NFR BLD AUTO: 1.3 % — HIGH (ref 0–0.9)
LYMPHOCYTES # BLD AUTO: 0.61 K/UL — LOW (ref 1–3.3)
LYMPHOCYTES # BLD AUTO: 9.8 % — LOW (ref 13–44)
MAGNESIUM SERPL-MCNC: 2.2 MG/DL — SIGNIFICANT CHANGE UP (ref 1.6–2.6)
MCHC RBC-ENTMCNC: 26.4 PG — LOW (ref 27–34)
MCHC RBC-ENTMCNC: 30.9 GM/DL — LOW (ref 32–36)
MCV RBC AUTO: 85.3 FL — SIGNIFICANT CHANGE UP (ref 80–100)
MONOCYTES # BLD AUTO: 0.9 K/UL — SIGNIFICANT CHANGE UP (ref 0–0.9)
MONOCYTES NFR BLD AUTO: 14.4 % — HIGH (ref 2–14)
NEUTROPHILS # BLD AUTO: 4.33 K/UL — SIGNIFICANT CHANGE UP (ref 1.8–7.4)
NEUTROPHILS NFR BLD AUTO: 69.4 % — SIGNIFICANT CHANGE UP (ref 43–77)
PHOSPHATE SERPL-MCNC: 3 MG/DL — SIGNIFICANT CHANGE UP (ref 2.5–4.5)
PLATELET # BLD AUTO: 131 K/UL — LOW (ref 150–400)
POTASSIUM SERPL-MCNC: 3.8 MMOL/L — SIGNIFICANT CHANGE UP (ref 3.5–5.3)
POTASSIUM SERPL-SCNC: 3.8 MMOL/L — SIGNIFICANT CHANGE UP (ref 3.5–5.3)
RBC # BLD: 3.41 M/UL — LOW (ref 4.2–5.8)
RBC # FLD: 18.2 % — HIGH (ref 10.3–14.5)
SODIUM SERPL-SCNC: 145 MMOL/L — SIGNIFICANT CHANGE UP (ref 135–145)
SPECIMEN SOURCE: SIGNIFICANT CHANGE UP
VANCOMYCIN TROUGH SERPL-MCNC: 16.8 UG/ML — SIGNIFICANT CHANGE UP (ref 10–20)
WBC # BLD: 6.24 K/UL — SIGNIFICANT CHANGE UP (ref 3.8–10.5)
WBC # FLD AUTO: 6.24 K/UL — SIGNIFICANT CHANGE UP (ref 3.8–10.5)

## 2024-03-19 PROCEDURE — 99233 SBSQ HOSP IP/OBS HIGH 50: CPT

## 2024-03-19 PROCEDURE — 99232 SBSQ HOSP IP/OBS MODERATE 35: CPT

## 2024-03-19 RX ORDER — TRAMADOL HYDROCHLORIDE 50 MG/1
25 TABLET ORAL EVERY 6 HOURS
Refills: 0 | Status: DISCONTINUED | OUTPATIENT
Start: 2024-03-19 | End: 2024-03-21

## 2024-03-19 RX ORDER — SODIUM CHLORIDE 9 MG/ML
500 INJECTION INTRAMUSCULAR; INTRAVENOUS; SUBCUTANEOUS ONCE
Refills: 0 | Status: COMPLETED | OUTPATIENT
Start: 2024-03-19 | End: 2024-03-19

## 2024-03-19 RX ORDER — ACETAMINOPHEN 500 MG
650 TABLET ORAL EVERY 6 HOURS
Refills: 0 | Status: DISCONTINUED | OUTPATIENT
Start: 2024-03-19 | End: 2024-03-21

## 2024-03-19 RX ORDER — ACETAMINOPHEN 500 MG
1000 TABLET ORAL ONCE
Refills: 0 | Status: COMPLETED | OUTPATIENT
Start: 2024-03-19 | End: 2024-03-19

## 2024-03-19 RX ADMIN — SODIUM CHLORIDE 166.67 MILLILITER(S): 9 INJECTION INTRAMUSCULAR; INTRAVENOUS; SUBCUTANEOUS at 16:34

## 2024-03-19 RX ADMIN — LIDOCAINE 1 PATCH: 4 CREAM TOPICAL at 09:54

## 2024-03-19 RX ADMIN — HEPARIN SODIUM 5000 UNIT(S): 5000 INJECTION INTRAVENOUS; SUBCUTANEOUS at 22:05

## 2024-03-19 RX ADMIN — Medication 250 MILLIGRAM(S): at 09:57

## 2024-03-19 RX ADMIN — ATORVASTATIN CALCIUM 40 MILLIGRAM(S): 80 TABLET, FILM COATED ORAL at 22:06

## 2024-03-19 RX ADMIN — GABAPENTIN 100 MILLIGRAM(S): 400 CAPSULE ORAL at 22:06

## 2024-03-19 RX ADMIN — Medication 650 MILLIGRAM(S): at 22:38

## 2024-03-19 RX ADMIN — Medication 81 MILLIGRAM(S): at 09:54

## 2024-03-19 RX ADMIN — Medication 1000 MILLIGRAM(S): at 15:00

## 2024-03-19 RX ADMIN — LIDOCAINE 1 PATCH: 4 CREAM TOPICAL at 16:48

## 2024-03-19 RX ADMIN — LISINOPRIL 2.5 MILLIGRAM(S): 2.5 TABLET ORAL at 09:55

## 2024-03-19 RX ADMIN — Medication 650 MILLIGRAM(S): at 22:08

## 2024-03-19 RX ADMIN — Medication 400 MILLIGRAM(S): at 14:23

## 2024-03-19 RX ADMIN — GABAPENTIN 100 MILLIGRAM(S): 400 CAPSULE ORAL at 14:23

## 2024-03-19 RX ADMIN — HEPARIN SODIUM 5000 UNIT(S): 5000 INJECTION INTRAVENOUS; SUBCUTANEOUS at 09:54

## 2024-03-19 RX ADMIN — Medication 250 MILLIGRAM(S): at 22:05

## 2024-03-19 RX ADMIN — Medication 25 MILLIGRAM(S): at 09:54

## 2024-03-19 RX ADMIN — GABAPENTIN 100 MILLIGRAM(S): 400 CAPSULE ORAL at 05:23

## 2024-03-19 NOTE — PROGRESS NOTE ADULT - ASSESSMENT
89 year old male who fell last night and then was found down today PMH CAD, endovascular repair, no known kidney disease, no heart failure known  In ED. Patient had MTP called, got 1, 1, 1, hgb 5.9 was on aspirin at home. CT showed left acetabular fracture, with inferior rami fracture, no blush on CTA hgb 5.9 got 3 units of blood;  Downgraded from icu. CT: pelvic, left acetabular fracture resultant RP hematoma, acute blood loss anemia s/p prbc. NSTEMI-> no ishcemic eval, pt and family refused.   ?bacteremia with coag neg stau-> believed to be contaminant.       #S/p fall s/p L pelvic fracture  #ABLA s/p prbc transfusion  #L RP hematoma/L iliopsoas hematoma  Currently no acute ortho surgery/intervention indicated per attending and orthopaedic traumatologist - family made aware and agrees  - WBAT per ortho  - F/U Dr Nair o/p  - multi modal pain control        #NSTEMI  - pt refusing echo  - cardiology following  - cardiology not pursuing echo at this time as pt continues to refuse therefore no TAVR is planned  - cardiology recc anti-plt therapy + statin once surgery clears   - BB/ ACE-I  - DNR//I      #Coag negative STAU bacteremia  - repeat cultures: coag negative Staph aureus-> ID recc iv vanco  - repeat cultures in am  - no leukocytosis or fevers  - pt non toxic appearing  - D/W ID      #BALWINDER monitor,   - got contrast, creatinine improving  - resolved      No other planned interventions per cardiology and orthopedics  I spoke with dtr in detail regarding pursual of ischemic eval and or use of life vest given new EF 30%. Dtr states father stated he wanted no heroic measures carried out and at this juncture she does not wish to pursue any of this however she will confirm with her other 2 siblings (pt has mild- mod dementia) and wishes for palliative consult. I have d/w ID and palliative care team  Pall team will reach out for family meeting  ID recc: repeat cultures and resume iv vanco for now 89 year old male who fell last night and then was found down today PMH CAD, endovascular repair, no known kidney disease, no heart failure known  In ED. Patient had MTP called, got 1, 1, 1, hgb 5.9 was on aspirin at home. CT showed left acetabular fracture, with inferior rami fracture, no blush on CTA hgb 5.9 got 3 units of blood;  Downgraded from icu. CT: pelvic, left acetabular fracture resultant RP hematoma, acute blood loss anemia s/p prbc. NSTEMI-> no ishcemic eval, pt and family refused.   ?bacteremia with coag neg stau-> believed to be contaminant.     #S/p fall s/p L pelvic fracture  #ABLA s/p prbc transfusion  #L RP hematoma/L iliopsoas hematoma  Currently no acute ortho surgery/intervention indicated per attending and orthopaedic traumatologist - family made aware and agrees  - WBAT per ortho  - F/U Dr Nair o/p  - multi modal pain control    #NSTEMI  - pt refusing echo  - cardiology following  - cardiology not pursuing echo at this time as pt continues to refuse therefore no TAVR is planned  - cardiology recc anti-plt therapy + statin once surgery clears   - BB/ ACE-I --> hold for now 2/2 hypotension     #Coag negative STAU bacteremia  - repeat cultures: coag negative Staph aureus-> ID recc iv vanco  - f/u repeat cultures   - no leukocytosis or fevers  - pt non toxic appearing    #BALWINDER monitor  - got contrast, creatinine improving  - resolved    #GOC  - DNR//I    #Severe protein-calorie malnutrition and Underweight (BMI < 19)  - nutrition consult noted   - add supplements     No other planned interventions per cardiology and orthopedics    Per previous d/w family:   d/w dtr in detail regarding ischemic eval and or use of life vest given new EF 30%. Dtr states father stated he wanted no heroic measures carried out and at this juncture she does not wish to pursue any of this however she will confirm with her other 2 siblings (pt has mild- mod dementia) and wishes for palliative consult.   Pall team will reach out for family meeting  ID recc: repeat cultures and resume iv vanco for now    DIPSO: d/c planning likely for [facility]

## 2024-03-19 NOTE — PROGRESS NOTE ADULT - ASSESSMENT
89yo M presents s/p unwitnessed fall with comminuted left acetabular/pelvic fracture with moderate hematoma in left retroperitoneum/pelvis/iliopsoas muscle    Per family discussion, discussed with daughter, family agrees with conservative treatment, but would not want any surgical intervention. verbal DNR/DNI  Echo performed EF 30%    Plan:    -multimodal pain control prn  - Trend troponins (trending down), CBC. transfuse prn   - monitor VS  - cardiology recs appreciated,  echo performed, , TVAR on hold   - GOC discussion today with family  - continue IVF  - orthopedic surgery recs appreciated   - SCD for DVT PPX, hold pharmaceutical ppx and aspirin  - CM dispo planning  - ID on board, bacteremia  - DVT ppx  - ASA    Plan discussed with trauma surgery team and attending Dr. Collazo     89yo M presents s/p unwitnessed fall with comminuted left acetabular/pelvic fracture with moderate hematoma in left retroperitoneum/pelvis/iliopsoas muscle    Per family discussion, discussed with daughter, family agrees with conservative treatment, but would not want any surgical intervention. verbal DNR/DNI  Echo performed EF 30%    Plan:    -multimodal pain control prn  - Trend troponins (trending down), CBC. transfuse prn   - monitor VS  - cardiology recs appreciated,  echo performed, , TVAR on hold   - GOC discussion today with family  - continue IVF  - orthopedic surgery recs appreciated   - SCD for DVT PPX, hold pharmaceutical ppx and aspirin  - CM dispo planning  - ID on board, bacteremia  - DVT ppx  - ASA    Plan discussed with trauma surgery team and attending Dr. Collazo    Attending A/P:    Chart reviewed, patient examined, agree with above resident evaluation in addition to the following    Keila resolved, echo with reduced EF, cardiology and ID,ortho on board, palliative and SW to have family meeting for GOC    alternate day labs unless change in clinical status, dispo planning and GOC discussions with HCP      35 minuted of time spent on pt examination, review of relevant labs and radiologic studies, assured stabilization of pt, discussion with relevant services/providers for coordination of pt care and services

## 2024-03-19 NOTE — PROGRESS NOTE ADULT - SUBJECTIVE AND OBJECTIVE BOX
HPI: pt seen and examined with no family at bedside, patient remains confused conversations held today with daughter over the phone - family is leaning toward focusing on comfort at [facility] and transitioning to long term care. Awaiting call back from patient son Torsten as he is listed as primary HCP     PAIN: (X )Yes   ( )No  Level: mild at rest  Location: knee  Intensity:   3 /10  Quality: ache  Aggravating Factors: movement    DYSPNEA: ( ) Yes  (X ) No  Level:    Review of Systems:    Unable to obtain/Limited due to: AMS    PHYSICAL EXAM:    Vital Signs Last 24 Hrs  T(C): 37.2 (19 Mar 2024 07:38), Max: 37.2 (19 Mar 2024 07:38)  T(F): 98.9 (19 Mar 2024 07:38), Max: 98.9 (19 Mar 2024 07:38)  HR: 74 (19 Mar 2024 07:38) (74 - 84)  BP: 110/59 (19 Mar 2024 07:38) (98/56 - 110/59)  RR: 18 (19 Mar 2024 07:38) (18 - 18)  SpO2: 98% (19 Mar 2024 07:38) (84% - 98%)    Parameters below as of 19 Mar 2024 07:38  Patient On (Oxygen Delivery Method): nasal cannula  O2 Flow (L/min): 2    Daily     Daily     PPSV2: 20-30  %    General: Elderly male in bed in mild distress  Mental Status: Disoriented to time and place  HEENT: oral mucosa dry  Lungs: lungs clear to auscultation  Cardiac: S1S2+  GI: abd soft NT ND + BS  : voids  Ext: moves on bed  Neuro: disoriented    LABS:                        9.0    6.24  )-----------( 131      ( 19 Mar 2024 06:34 )             29.1     03-19    145  |  114<H>  |  28<H>  ----------------------------<  100<H>  3.8   |  24  |  1.04    Ca    8.5      19 Mar 2024 06:34  Phos  3.0     03-19  Mg     2.2     03-19    Albumin: Albumin: 3.1 g/dL (03-15 @ 05:47)    Allergies    No Known Allergies    Intolerances      MEDICATIONS  (STANDING):  acetaminophen   IVPB .. 1000 milliGRAM(s) IV Intermittent once  aspirin  chewable 81 milliGRAM(s) Oral daily  atorvastatin 40 milliGRAM(s) Oral at bedtime  gabapentin 100 milliGRAM(s) Oral three times a day  heparin   Injectable 5000 Unit(s) SubCutaneous every 12 hours  lidocaine   4% Patch 1 Patch Transdermal daily  lisinopril 2.5 milliGRAM(s) Oral daily  metoprolol succinate ER 25 milliGRAM(s) Oral daily  vancomycin  IVPB 750 milliGRAM(s) IV Intermittent every 12 hours    MEDICATIONS  (PRN):  acetaminophen     Tablet .. 650 milliGRAM(s) Oral every 6 hours PRN Mild Pain (1 - 3)  acetaminophen   IVPB .. 1000 milliGRAM(s) IV Intermittent once PRN Temp greater or equal to 38C (100.4F), Mild Pain (1 - 3)  ondansetron Injectable 4 milliGRAM(s) IV Push every 6 hours PRN Nausea  traMADol 25 milliGRAM(s) Oral every 6 hours PRN Moderate Pain (4 - 6) or Severe Pain (7 - 10)

## 2024-03-19 NOTE — PROGRESS NOTE ADULT - ASSESSMENT
89 yo M with PMHx as above presents after being found on the ground found to have elevated troponins with rhabdo and a retroperitoneal bleed    -Elevated troponins - Concerning this may have been an ischemic event but with his retroperitoneal bleed he was not a candidate for DAPT and/or heparin gtt. He is currently asymptomatic with stable EKG but now has a new CM.  -Agree with low dose ACE since BP on lower side and c/w BB, ASA and statin.  -Has ?pleural effusion on TTE but asymptomatic and clinically dose not appear to be in heart failure. Will hold off on lasix for now but monitor fluid status closely. Recommend low salt diet.  -Palliative care on board, plan for likely comfort measures therefore not a candidate for lifevest vs ICD.

## 2024-03-19 NOTE — PROGRESS NOTE ADULT - SUBJECTIVE AND OBJECTIVE BOX
SURGERY DAILY PROGRESS NOTE:   CC: Patient is a 89y old  Male who presents with a chief complaint of unwitnessed fall (17 Mar 2024 14:52)    Subjective:  Patient seen and examined this AM at bedside.   No acute events overnight and patient resting comfortably. Follows commands, tolerating diet.   Denies fever/chills, shortness of breath, chest pain. VS reviewed    ROS: as above otherwise negative      PHYSICAL EXAMS  GCS of 15  AAOx1  Airway is patent  Breathing is symmetric and unlabored  Neuro: AA ox2 intermittently, dementia at baseline, moving all 4, LLE in splint/cast  Psych: normal affect  HEENT: Normocephalic, atraumatic, BONITA, EOM wnl, no otorrhea or hemotympanum b/l, no epistaxis or d/c b/l nares,  Neck: . No crepitus, no ecchymosis, no hematoma, to exam, , no tracheal deviation  Cspine/thoracolumbrosacral spine: no gross bony pathology or tenderness to exam  Cardiovascular: S1S2 Present  Chest: no gross rib pathology or tenderness to exam. No sternal pathology or tenderness to exam. No crepitus, no ecchymosis, no hematoma. No penetrating thorcoabdominal trauma  Respiratory: Rate is 18; Respiratory Effort normal; no wheezes, rales or rhonchi to exam  ABD: bowel sounds (+), soft, non distended, no rebound, no guarding, no rigidity, no skin changes to exam. L hip tenderness   Genitourinary: No scrotal/perineal/perirectal hematoma/ecchymosis/tenderness to exam  Musculoskeletal: L leg with acewrap, limited ROM.  Pt has palpable b/l radial, femoral, dorsalis pedis pulses. All digits are warm and well perfused.  Pt demonstrates grossly intact sensorimotor function to limited exam. Pt has good capillary refill to digits, no calf edema or tenderness to exam.  Skin: multiple ecchymosis on b/l UE. Superficial abrasion at R elbow    Vitals:  T(C): 36.7 ( @ 22:12), Max: 37.1 ( @ 07:23)  HR: 75 ( @ 22:12) (75 - 84)  BP: 108/56 ( @ 22:12) (98/56 - 108/74)  RR: 18 ( @ 22:12) (18 - 18)  SpO2: 93% ( @ 22:12) (90% - 93%)     @ 07:01  -   @ 02:24  --------------------------------------------------------  IN:    Oral Fluid: 300 mL  Total IN: 300 mL    OUT:  Total OUT: 0 mL    Total NET: 300 mL          Physical Exam:  General: AAOx3, Well developed, NAD  Chest: Normal respiratory effort  Heart: RRR  Abdomen: Soft, NTND, no masses  Neuro/Psych: No localized deficits. Normal spech, normal tone  Skin: Normal, no rashes, no lesions noted.   Extremities: Warm, well perfused, no edema, Pulses intact     @ 07:17                    9.1  CBC: 5.63>)-------(<122                     28.1                 144 | 113 | 28    CMP:  ----------------------< 111               3.6 | 26 | 1.13                      Ca:8.6  Phos:2.3  M.3               -|      |-        LFTs:  ------|-|-----             -|      |-      Culture - Blood (collected 24 @ 13:03)  Source: .Blood None  Gram Stain (24 @ 18:13):    Growth in aerobic bottle: Gram Positive Cocci in Clusters    Growth in anaerobic bottle: Gram Positive Cocci in Clusters  Preliminary Report (24 @ 18:13):    Growth in aerobic bottle: Gram Positive Cocci in Clusters    Growth in anaerobic bottle: Gram Positive Cocci in Clusters    Culture - Urine (collected 24 @ 16:25)  Source: Clean Catch None  Final Report (03-15-24 @ 23:56):    No growth    Culture - Blood (collected 24 @ 15:40)  Source: .Blood None  Preliminary Report (24 @ 23:01):    No growth at 4 days    Culture - Blood (collected 24 @ 15:08)  Source: .Blood Blood-Venous  Gram Stain (24 @ 22:34):    Growth in anaerobic bottle: Gram positive cocci in pairs  Final Report (24 @ 15:29):    Growth in anaerobic bottle: Staphylococcus capitis    Isolation of Coagulase negative Staphylococcus from single blood culture    sets may represent    contamination. Contact the Microbiology Department at [phone] if    susceptibility testing is    clinically indicated.    Direct identification is available within approximately 3-5    hours either by Blood Panel Multiplexed PCR or Direct    MALDI-TOF. Details: [email or web address]  Organism: Blood Culture PCR (24 @ 15:29)  Organism: Blood Culture PCR (24 @ 15:29)      Method Type: PCR      -  Coagulase negative Staphylococcus: Detec      Current Inpatient Medications:  acetaminophen   IVPB .. 1000 milliGRAM(s) IV Intermittent once PRN  aspirin  chewable 81 milliGRAM(s) Oral daily  atorvastatin 40 milliGRAM(s) Oral at bedtime  gabapentin 100 milliGRAM(s) Oral three times a day  heparin   Injectable 5000 Unit(s) SubCutaneous every 12 hours  HYDROmorphone  Injectable 0.5 milliGRAM(s) IV Push every 4 hours PRN  lidocaine   4% Patch 1 Patch Transdermal daily  lisinopril 2.5 milliGRAM(s) Oral daily  metoprolol succinate ER 25 milliGRAM(s) Oral daily  ondansetron Injectable 4 milliGRAM(s) IV Push every 6 hours PRN  vancomycin  IVPB 750 milliGRAM(s) IV Intermittent every 12 hours    Imaging review:    No new imaging in the last 24 h

## 2024-03-19 NOTE — PROGRESS NOTE ADULT - SUBJECTIVE AND OBJECTIVE BOX
89 year old male with PMH CAD, endovascular repair, no known kidney disease, no heart failure known, who fell last and then was found down. In ED. Patient had MTP called, got 1, 1, 1, hgb 5.9 was on aspirin at home. CT showed left acetabular fracture, with inferior rami fracture, no blush on CTA hgb 5.9 got 3 units of blood; Downgraded from icu. CT: pelvic, left acetabular fracture resultant RP hematoma, acute blood loss anemia s/p prbc. NSTEMI-> no ishcemic eval, pt and family refused.   ?bacteremia with coag neg stau-> believed to be contaminant.   DNR/I  Hospitalist consulted      3/19: Patient seen this AM, sleeping, easily awakened, c/o pain. Noted hypotensive this afternoon, received AM metoprolol and lisinopril.     ROS: unable to obtain     MEDICATIONS  (STANDING):  aspirin  chewable 81 milliGRAM(s) Oral daily  atorvastatin 40 milliGRAM(s) Oral at bedtime  gabapentin 100 milliGRAM(s) Oral three times a day  heparin   Injectable 5000 Unit(s) SubCutaneous every 12 hours  lidocaine   4% Patch 1 Patch Transdermal daily  sodium chloride 0.9% Bolus 500 milliLiter(s) IV Bolus once  vancomycin  IVPB 750 milliGRAM(s) IV Intermittent every 12 hours    MEDICATIONS  (PRN):  acetaminophen     Tablet .. 650 milliGRAM(s) Oral every 6 hours PRN Mild Pain (1 - 3)  acetaminophen   IVPB .. 1000 milliGRAM(s) IV Intermittent once PRN Temp greater or equal to 38C (100.4F), Mild Pain (1 - 3)  ondansetron Injectable 4 milliGRAM(s) IV Push every 6 hours PRN Nausea  traMADol 25 milliGRAM(s) Oral every 6 hours PRN Moderate Pain (4 - 6) or Severe Pain (7 - 10)      Vital Signs Last 24 Hrs  T(C): 36.6 (19 Mar 2024 16:20), Max: 37.2 (19 Mar 2024 07:38)  T(F): 97.8 (19 Mar 2024 16:20), Max: 98.9 (19 Mar 2024 07:38)  HR: 54 (19 Mar 2024 16:20) (54 - 75)  BP: 80/46 (19 Mar 2024 16:20) (80/46 - 110/59)  BP(mean): --  RR: 18 (19 Mar 2024 16:20) (18 - 18)  SpO2: 94% (19 Mar 2024 16:20) (84% - 98%)    Parameters below as of 19 Mar 2024 16:20  Patient On (Oxygen Delivery Method): nasal cannula  O2 Flow (L/min): 2      GEN: NAD, comfortable, resting in bed  HEENT: NC/AT, EOMI, PERRLA, MMM, clear conjunctiva and sclera, normal hearing, no nasal discharge, throat clear, no thrush, normal dentition.   NECK: supple, no JVD, no LAD, no thyromegaly  BACK:  ROM intact, no spinal/paraspinal tenderness  CV: S1S2, RRR, no mumur  RESP: good air movement, CTABL, no rales, rhonchi or wheezing, respirations unlabored  ABD: +BS, soft, ND, NT, no guarding, no rigidity, no HSM  EXT: +2 radial and pedal pulses, no edema, no calve tenderness  SKIN: No visible Rashes or Ulcers  MSK: ROM intact in all extremities  NEURO:  sensory and CN 2-12 Grossly intact, no motor deficits, no, saddle anesthesia, AOx3  PSYCH: normal behavior         LABS:                          9.0    6.24  )-----------( 131      ( 19 Mar 2024 06:34 )             29.1     19 Mar 2024 06:34    145    |  114    |  28     ----------------------------<  100    3.8     |  24     |  1.04     Ca    8.5        19 Mar 2024 06:34  Phos  3.0       19 Mar 2024 06:34  Mg     2.2       19 Mar 2024 06:34          CAPILLARY BLOOD GLUCOSE            Urinalysis Basic - ( 19 Mar 2024 06:34 )    Color: x / Appearance: x / SG: x / pH: x  Gluc: 100 mg/dL / Ketone: x  / Bili: x / Urobili: x   Blood: x / Protein: x / Nitrite: x   Leuk Esterase: x / RBC: x / WBC x   Sq Epi: x / Non Sq Epi: x / Bacteria: x        RADIOLOGY:         89 year old male with PMH CAD, endovascular repair, no known kidney disease, no heart failure known, who fell last and then was found down. In ED. Patient had MTP called, got 1, 1, 1, hgb 5.9 was on aspirin at home. CT showed left acetabular fracture, with inferior rami fracture, no blush on CTA hgb 5.9 got 3 units of blood; Downgraded from icu. CT: pelvic, left acetabular fracture resultant RP hematoma, acute blood loss anemia s/p prbc. NSTEMI-> no ishcemic eval, pt and family refused.   ?bacteremia with coag neg stau-> believed to be contaminant.   DNR/I  Hospitalist consulted      3/19: Patient seen this AM, sleeping, easily awakened, c/o pain. Noted hypotensive this afternoon, received AM metoprolol and lisinopril.     REVIEW OF SYSTEMS:    CONSTITUTIONAL: No weakness, fevers or chills  EYES/ENT: No visual changes;  No vertigo or throat pain   NECK: No pain or stiffness  RESPIRATORY: No cough, wheezing, hemoptysis; No shortness of breath  CARDIOVASCULAR: No chest pain or palpitations  GASTROINTESTINAL: No abdominal or epigastric pain. No nausea, vomiting, or hematemesis; No diarrhea or constipation. No melena or hematochezia.  GENITOURINARY: No dysuria, frequency or hematuria  NEUROLOGICAL: No numbness or weakness  SKIN: No itching, rashes  MSK: + hip pain     MEDICATIONS  (STANDING):  aspirin  chewable 81 milliGRAM(s) Oral daily  atorvastatin 40 milliGRAM(s) Oral at bedtime  gabapentin 100 milliGRAM(s) Oral three times a day  heparin   Injectable 5000 Unit(s) SubCutaneous every 12 hours  lidocaine   4% Patch 1 Patch Transdermal daily  sodium chloride 0.9% Bolus 500 milliLiter(s) IV Bolus once  vancomycin  IVPB 750 milliGRAM(s) IV Intermittent every 12 hours    MEDICATIONS  (PRN):  acetaminophen     Tablet .. 650 milliGRAM(s) Oral every 6 hours PRN Mild Pain (1 - 3)  acetaminophen   IVPB .. 1000 milliGRAM(s) IV Intermittent once PRN Temp greater or equal to 38C (100.4F), Mild Pain (1 - 3)  ondansetron Injectable 4 milliGRAM(s) IV Push every 6 hours PRN Nausea  traMADol 25 milliGRAM(s) Oral every 6 hours PRN Moderate Pain (4 - 6) or Severe Pain (7 - 10)      Vital Signs Last 24 Hrs  T(C): 36.6 (19 Mar 2024 16:20), Max: 37.2 (19 Mar 2024 07:38)  T(F): 97.8 (19 Mar 2024 16:20), Max: 98.9 (19 Mar 2024 07:38)  HR: 54 (19 Mar 2024 16:20) (54 - 75)  BP: 80/46 (19 Mar 2024 16:20) (80/46 - 110/59)  RR: 18 (19 Mar 2024 16:20) (18 - 18)  SpO2: 94% (19 Mar 2024 16:20) (84% - 98%)    Parameters below as of 19 Mar 2024 16:20  Patient On (Oxygen Delivery Method): nasal cannula  O2 Flow (L/min): 2      PHYSICAL EXAM:  GENERAL: NAD, lying in bed comfortably  HEAD:  Atraumatic, Normocephalic  EYES: conjunctiva and sclera clear  ENT: Moist mucous membranes  NECK: Supple, No JVD  CHEST/LUNG: Clear to auscultation bilaterally; No rales, rhonchi, wheezing. Unlabored respirations  HEART: Regular rate and rhythm; No murmurs  ABDOMEN: Bowel sounds present; Soft, Nontender, Nondistended.   EXTREMITIES:  2+ Peripheral Pulses, brisk capillary refill. No clubbing, cyanosis, or edema  NERVOUS SYSTEM:  Alert & Oriented X3, speech clear. No deficits   MSK: FROM all 4 extremities, full and equal strength  SKIN: bruising to LUE        LABS:                          9.0    6.24  )-----------( 131      ( 19 Mar 2024 06:34 )             29.1     19 Mar 2024 06:34    145    |  114    |  28     ----------------------------<  100    3.8     |  24     |  1.04     Ca    8.5        19 Mar 2024 06:34  Phos  3.0       19 Mar 2024 06:34  Mg     2.2       19 Mar 2024 06:34          CAPILLARY BLOOD GLUCOSE            Urinalysis Basic - ( 19 Mar 2024 06:34 )    Color: x / Appearance: x / SG: x / pH: x  Gluc: 100 mg/dL / Ketone: x  / Bili: x / Urobili: x   Blood: x / Protein: x / Nitrite: x   Leuk Esterase: x / RBC: x / WBC x   Sq Epi: x / Non Sq Epi: x / Bacteria: x        RADIOLOGY:

## 2024-03-19 NOTE — PROGRESS NOTE ADULT - SUBJECTIVE AND OBJECTIVE BOX
Date of service: 03-19-24 @ 13:02    Lying in bed in NAD  Lethargic  Reported with STAU bacteremia  Dose not seem in pain    ROS: no fever or chills; poorly verbal    MEDICATIONS  (STANDING):  acetaminophen   IVPB .. 1000 milliGRAM(s) IV Intermittent once  aspirin  chewable 81 milliGRAM(s) Oral daily  atorvastatin 40 milliGRAM(s) Oral at bedtime  gabapentin 100 milliGRAM(s) Oral three times a day  heparin   Injectable 5000 Unit(s) SubCutaneous every 12 hours  lidocaine   4% Patch 1 Patch Transdermal daily  lisinopril 2.5 milliGRAM(s) Oral daily  metoprolol succinate ER 25 milliGRAM(s) Oral daily  vancomycin  IVPB 750 milliGRAM(s) IV Intermittent every 12 hours    Vital Signs Last 24 Hrs  T(C): 37.2 (19 Mar 2024 07:38), Max: 37.2 (19 Mar 2024 07:38)  T(F): 98.9 (19 Mar 2024 07:38), Max: 98.9 (19 Mar 2024 07:38)  HR: 74 (19 Mar 2024 07:38) (74 - 84)  BP: 110/59 (19 Mar 2024 07:38) (98/56 - 110/59)  BP(mean): --  RR: 18 (19 Mar 2024 07:38) (18 - 18)  SpO2: 98% (19 Mar 2024 07:38) (84% - 98%)    Parameters below as of 19 Mar 2024 07:38  Patient On (Oxygen Delivery Method): nasal cannula  O2 Flow (L/min): 2       Physical exam:    Constitutional:  No acute distress  HEENT: NC/AT, EOMI, PERRLA, conjunctivae clear; ears and nose atraumatic  Neck: supple; thyroid not palpable  Back: no tenderness  Respiratory: respiratory effort normal; clear to auscultation  Cardiovascular: S1S2 regular, no murmurs  Abdomen: soft, not tender, not distended, positive BS  Genitourinary: no suprapubic tenderness  Lymphatic: no LN palpable  Musculoskeletal: no muscle tenderness, no joint swelling or tenderness  Has left hip pain  Extremities: no pedal edema  Neurological/ Psychiatric: Alert, moving all extremities  Skin: no rashes; no palpable lesions    Labs: all available labs reviewed                        8.4    5.65  )-----------( 105      ( 17 Mar 2024 06:05 )             26.2     03-17    144  |  114<H>  |  31<H>  ----------------------------<  98  3.7   |  25  |  1.02    Ca    8.4<L>      17 Mar 2024 06:05  Phos  1.6     03-17  Mg     2.5     03-17    Culture - Blood (collected 17 Mar 2024 13:03)  Source: .Blood None  Gram Stain (18 Mar 2024 18:13):    Growth in aerobic bottle: Gram Positive Cocci in Clusters    Growth in anaerobic bottle: Gram Positive Cocci in Clusters  Preliminary Report (19 Mar 2024 08:07):    Growth in aerobic and anaerobic bottles: Staphylococcus aureus    Susceptibility to follow.    Culture - Urine (collected 14 Mar 2024 16:25)  Source: Clean Catch None  Final Report (15 Mar 2024 23:56):    No growth    Culture - Blood (collected 14 Mar 2024 15:40)  Source: .Blood None  Preliminary Report (18 Mar 2024 23:01):    No growth at 4 days    Culture - Blood (collected 14 Mar 2024 15:08)  Source: .Blood Blood-Venous  Gram Stain (16 Mar 2024 22:34):    Growth in anaerobic bottle: Gram positive cocci in pairs  Final Report (17 Mar 2024 15:29):    Growth in anaerobic bottle: Staphylococcus capitis    Isolation of Coagulase negative Staphylococcus from single blood culture    sets may represent    contamination. Contact the Microbiology Department at [phone] if    susceptibility testing is    clinically indicated.    Direct identification is available within approximately 3-5    hours either by Blood Panel Multiplexed PCR or Direct    MALDI-TOF. Details: [email or web address]  Organism: Blood Culture PCR (17 Mar 2024 15:29)  Organism: Blood Culture PCR (17 Mar 2024 15:29)      Method Type: PCR      -  Coagulase negative Staphylococcus: Detec    Radiology: all available radiological tests reviewed    < from: Xray Pelvis AP only (03.15.24 @ 09:30) >  IMPRESSION:  Comminuted impacted fracture of the left acetabulum with protrusion of the femoral head medially into the pelvis.  No definite fracture of the left femur were knee.    There is questionable linear lucency in the distal fibula however   overlying bandages and splint limiting evaluation. Recommend dedicated films without the splint when the patient is able.  < end of copied text >      Advanced directives addressed: full resuscitation

## 2024-03-19 NOTE — PROGRESS NOTE ADULT - ASSESSMENT
Pt is a 89y old Male who presents s/p unwitnessed fall. Initially hypotensive w/ MAPs 60s and rectal T 94.4F in trauma bay .CTA was conducted which revealed a retroperitoneal/pelvic hematoma without extravasation as well as a L acetabular fx.  Pt subjective limited 2/2 mental status, currently AOx2 and does not remember events. Denying numbness/tingling or pain anywhere other than L hip at this time. ED spoke to family at bedside who reported unwitnessed fall and baseline ambulatory status of minimal ambulator w/ a walker. States inability to walk immediately following the injury. Pt has sustained Comminuted left acetabular fx w/ slight intrapelvic shift of the femoral head in relation to contralateral side. No other acute obvious fxs appreciated. Ortho eval noted. Palliative Medicne Consult to further establish GOC  3/15/24 Seen and examined at bedside. Alert however disoriented to time and place. Endorses knee pain R/T arthritis.     1) S/P Fall  -CTA CAP: Comminuted left acetabular fx w/ slight intrapelvic shift of the femoral head in relation to contralateral side  -XR Pelvis + L Hip/Fem/Knee/Tib/Ankle: Acetabular fx as mentioned  -Ortho eval noted  -Pain management with Dilaudid 0.5 mg IVP Q4H PRN  -PT/OT when tolerable  -Ice as tolerated    2) AMS  -? baseline dementia  -Delirium R/T hospitalization/trauma  -Supportive care    3) elevated Troponin   = there is a concern thia this was a ischemic event   - history CAD  -H/O CABG  -Cardio eval appreciated   - TTE revels reduced EF     4) Advanced Directives  -Pt without capacity  -Daughter surrogate  -MOLST DNR/DNI on chart  - awaiting call back from family to reviewed options     Time Spent: 45 minutes including the care, coordination and counseling of this patient, 50% of which was spent coordinating and counseling.

## 2024-03-19 NOTE — PROGRESS NOTE ADULT - SUBJECTIVE AND OBJECTIVE BOX
HPI:  3/14/24:  Mr. Bravo is a 89 yo male with a hx CAD s/p CABG, PAD s/p EVAR iliac aneurysm repair presenting after an unwitnessed fall. Pt was found on floor; no reported LOC or head strike.   Currently confused and unable to provide additional information. He denies chest pain, SOB.     In the ED, he was hypotensive 92% on room air.   Labs notable for  hgb 5.9, WBC 10.75, plt 103, Cr 2.35, Hs trop 393, 1079, 3223.   ECG NSR nonspecific ST-T wave abnormality.   CTAP showed Markedly comminuted left acetabular/left iliac bone fracture,   Moderate amount of hemorrhage in the left retroperitoneum,   left pelvis and left iliopsoas muscle, Nondisplaced fracture of the left inferior pubic ramus.    Received 2 units prbc.   3/16/'24: no cardiac complaints.  3/17/24: Patient had refuses TTE but remains stable.  3/18/24: Patient ended up getting TTE which showed new CM. He has no current complaints but has dementia    MEDICATIONS:  OUTPATIENT  Home Medications:  aspirin 81 mg oral tablet: orally once a day (14 Mar 2024 18:40)  atorvastatin 40 mg oral tablet: 1 tab(s) orally once a day (14 Mar 2024 18:39)  CoQ10 100 mg oral capsule: 1 cap(s) orally once a day (14 Mar 2024 18:40)  DULoxetine 30 mg oral delayed release capsule: 1 cap(s) orally once a day (14 Mar 2024 18:39)  losartan 25 mg oral tablet: 1 tab(s) orally once a day (14 Mar 2024 18:39)  naproxen 250 mg oral tablet: 1 tab(s) orally 2 times a day (14 Mar 2024 18:41)  Ocuvite oral tablet: 1 tab(s) orally once a day (14 Mar 2024 18:41)  Vitamin D3 25 mcg (1000 intl units) oral tablet: 1 tab(s) orally once a day (14 Mar 2024 18:41)    MEDICATIONS  (STANDING):  aspirin  chewable 81 milliGRAM(s) Oral daily  atorvastatin 40 milliGRAM(s) Oral at bedtime  gabapentin 100 milliGRAM(s) Oral three times a day  heparin   Injectable 5000 Unit(s) SubCutaneous every 12 hours  lidocaine   4% Patch 1 Patch Transdermal daily  lisinopril 2.5 milliGRAM(s) Oral daily  metoprolol succinate ER 25 milliGRAM(s) Oral daily  vancomycin  IVPB 750 milliGRAM(s) IV Intermittent every 12 hours    MEDICATIONS  (PRN):  acetaminophen   IVPB .. 1000 milliGRAM(s) IV Intermittent once PRN Temp greater or equal to 38C (100.4F), Mild Pain (1 - 3)  HYDROmorphone  Injectable 0.5 milliGRAM(s) IV Push every 4 hours PRN Severe Pain (7 - 10)  ondansetron Injectable 4 milliGRAM(s) IV Push every 6 hours PRN Nausea    Vital Signs Last 24 Hrs  T(C): 36.9 (17 Mar 2024 08:21), Max: 36.9 (17 Mar 2024 08:21)  T(F): 98.4 (17 Mar 2024 08:21), Max: 98.4 (17 Mar 2024 08:21)  HR: 61 (17 Mar 2024 09:00) (61 - 71)  BP: 110/64 (17 Mar 2024 09:00) (104/63 - 131/63)  BP(mean): 82 (16 Mar 2024 10:00) (82 - 82)  RR: 17 (17 Mar 2024 09:00) (17 - 22)  SpO2: 96% (17 Mar 2024 09:00) (92% - 98%)    Parameters below as of 17 Mar 2024 09:00  Patient On (Oxygen Delivery Method): room air    PHYSICAL EXAM:  Constitutional: NAD, awake and alert but confused - although remembers my father Dr. Miguel Massey and hearing about me throughout the years  HEENT: no JVD  Pulmonary: Non-labored, breath sounds are clear bilaterally, No wheezing, rales or rhonchi  Cardiovascular: RRR, III/VI NAMITA at RUSB and soft systolic murmur at apex  Gastrointestinal:  soft, nontender. +BS  Ext: no edema  Neurological: AAAOx2 - see above about knowing Dr. Miguel Massey - has issues with more short term memory - does not remember he is in the hospital and I have to frequently re-orient him to this  Skin: No rashes.     LABS:                          9.0    6.24  )-----------( 131      ( 19 Mar 2024 06:34 )             29.1                                 8.4    5.65  )-----------( 105      ( 17 Mar 2024 06:05 )             26.2                8.0    7.33  )-----------( 104      ( 16 Mar 2024 05:38 )             23.9                         8.4    8.71  )-----------( 107      ( 15 Mar 2024 16:53 )             25.5                         8.5    9.83  )-----------( 110      ( 15 Mar 2024 13:17 )             26.2     03-19    145  |  114<H>  |  28<H>  ----------------------------<  100<H>  3.8   |  24  |  1.04    Ca    8.5      19 Mar 2024 06:34  Phos  3.0     03-19  Mg     2.2     03-19      03-17    144  |  114<H>  |  31<H>  ----------------------------<  98  3.7   |  25  |  1.02    Ca    8.4<L>      17 Mar 2024 06:05  Phos  1.6     03-17  Mg     2.5     03-17      16 Mar 2024 05:38    144    |  112    |  46     ----------------------------<  83     3.4     |  24     |  1.37   15 Mar 2024 05:47    141    |  111    |  57     ----------------------------<  115    4.1     |  23     |  2.17   14 Mar 2024 15:40    140    |  110    |  53     ----------------------------<  109    4.7     |  21     |  2.35     Ca    8.3        16 Mar 2024 05:38  Ca    7.9        15 Mar 2024 05:47  Ca    8.6        14 Mar 2024 15:40  Phos  2.9       16 Mar 2024 05:38  Phos  5.0       15 Mar 2024 05:47  Phos  5.6       14 Mar 2024 15:40  Mg     2.5       16 Mar 2024 05:38  Mg     2.5       15 Mar 2024 05:47  Mg     2.4       14 Mar 2024 15:40    TPro  5.8    /  Alb  3.1    /  TBili  1.7    /  DBili  x      /  AST  85     /  ALT  46     /  AlkPhos  98     15 Mar 2024 05:47  TPro  5.6    /  Alb  3.2    /  TBili  1.7    /  DBili  x      /  AST  74     /  ALT  46     /  AlkPhos  104    14 Mar 2024 15:40    PT/INR - ( 15 Mar 2024 13:17 )   PT: 12.6 sec;   INR: 1.12 ratio         PTT - ( 15 Mar 2024 13:17 )  PTT:24.6 sec  ===============================  ===============================  CARDIAC BIOMARKERS:  BNP      TROPONIN  Troponin I, High Sensitivity Result: 2768.60 ng/L *H* (03-16-24 @ 05:38)  Troponin I, High Sensitivity Result: 3223.96 ng/L *H* (03-15-24 @ 05:47)  Troponin I, High Sensitivity Result: 1079.88 ng/L *H* (03-14-24 @ 19:43)  Troponin I, High Sensitivity Result: 393.80 ng/L *H* (03-14-24 @ 15:40)    ===============================  ===============================  EKG:    < from: 12 Lead ECG (03.14.24 @ 15:05) >    Diagnosis Line Normal sinus rhythm  Nonspecific ST and T wave abnormality  Prolonged QT  Abnormal ECG  No previous ECGs available  Confirmed by MD BERNSTEIN PAUL ([id number]) on 3/15/2024 1:06:00 PM    < end of copied text >    ===============================  ECHO:    ACC: [id number] EXAM:  ECHO TTE WO CON COMP W DOPP   ORDERED BY: PERLA ALAMO   PROCEDURE DATE:  03/18/2024      M-Mode Measurements (cm)     LVEDd: 5.4 cm             LVESd: 4.98 cm   IVSEd: 1.07 cm   LVPWd: 1.08 cm            AO Root Dimension: 3.9 cm                             ACS: 0.9 cm                             LA: 3.1 cm                             LVOT: 2.3 cm    Doppler Measurements:     AV Velocity:266 cm/s                 MV Peak E-Wave: 87 cm/s   AV Peak Gradient: 28.3 mmHg          MV Peak A-Wave: 124 cm/s   AV Mean Gradient: 17 mmHg            MV E/A Ratio: 0.7 %   AV Area (Continuity):1.48 cm^2       MV Peak Gradient: 3.03 mmHg   TR Velocity:310 cm/s                 MV P1/2t: 53 msec   TR Gradient:38.44 mmHg               MVA by PHT4.15   Estimated RAP:3 mmHg                 PV Peak Velocity: 71 cm/s   RVSP:41 mmHg                         PV Peak Gradient: 2.02 mmHg     Summary     The left ventricle size is moderately dilated, severe regional hypokinesis, and moderately to severely reduced function.   Estimated left ventricular ejection fraction is 30 %.   The left atrium is mildly dilated.   The right atrium appears mildly dilated.   The right ventricle is mildly dilated.   The aortic valve appears moderately calcified. Valve opening seems to be restricted.   Transaortic gradients are underestimated due to impaired left ventricle systolic function.   MARGARET by continuity equation (1.48 cm2) and dimensionless index (.35) suggest moderate aortic stenosis.   Mild (1+) aortic regurgitation is present.   The aortic root is mildly dilated .   The ascending aorta appears to be mildly to moderately dilated. (4.4 cm)   Moderate mitral annular calcification is present.   There is calcification of mitral valve leaflets. The leaflet opening is   mildly restricted.   Mean transmitral gradient is 4 mmHg; this finding is consistent with mild mitral stenosis.   Moderate (2+) mitral regurgitation is present.   EA reversal of the mitral inflow consistent with reduced compliance of the left ventricle.   The tricuspid valve leaflets appear mildly thickened and/or calcified, but open well.   Moderate (2+) tricuspid valve regurgitation is present.   Mild pulmonary hypertension.   Pulmonic valve not well seen.   Mild pulmonic valvular regurgitation (1+) is present.   Pleural effusion - is present.

## 2024-03-19 NOTE — PROGRESS NOTE ADULT - ASSESSMENT
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 The patient was found on floor, denied loss of conscious or head strike. Patient was on aspirin. Patient had pain in his pelvis. Trauma workup revealed CT showing L acetabular fracture with inferior pubic rami fracture, no blush, noted L retroperitoneum hematoma. Initial CBC Hbg 5.9 received 1pRBC/1plt/1FFP on arrival in ED, then further tx'd 2 pRBCs. He was felt to have acute blood loss anemia secondary to pelvic hemorrhage. Treatment for his fracture was considered, but no acute surgical procedure is contemplated. On 3/16 he was reported with bacteremia. Concern of an infectious process was raised.    1. Bacteremia with CNST and STAU. Pelvic hemorrhage. Episode of hypotension resolved. Anemia. Left hip acetabular fracture.  -cultures reviewed  -one BC bottle is showing CNST   -repeat BC shows GPC in clusters  -no clinical signs of sepsis  -ortho evaluation appreciated  -on vancomycin 750 mg IV q12h # 2  -tolerating abx well so far; no side effects noted  -obtain vancomycin trough level  -repeat BC x 2 collected  -conitnue abx coverage   -f/u cultures  -monitor temps  -f/u CBC  -supportive care  2. Other issues:   -care per medicine    IV to PO abx token dose not apply

## 2024-03-20 LAB
-  AMPICILLIN/SULBACTAM: SIGNIFICANT CHANGE UP
-  CEFAZOLIN: SIGNIFICANT CHANGE UP
-  CLINDAMYCIN: SIGNIFICANT CHANGE UP
-  ERYTHROMYCIN: SIGNIFICANT CHANGE UP
-  GENTAMICIN: SIGNIFICANT CHANGE UP
-  OXACILLIN: SIGNIFICANT CHANGE UP
-  PENICILLIN: SIGNIFICANT CHANGE UP
-  RIFAMPIN: SIGNIFICANT CHANGE UP
-  TETRACYCLINE: SIGNIFICANT CHANGE UP
-  TRIMETHOPRIM/SULFAMETHOXAZOLE: SIGNIFICANT CHANGE UP
-  VANCOMYCIN: SIGNIFICANT CHANGE UP
CULTURE RESULTS: ABNORMAL
GRAM STN FLD: ABNORMAL
METHOD TYPE: SIGNIFICANT CHANGE UP
ORGANISM # SPEC MICROSCOPIC CNT: ABNORMAL
ORGANISM # SPEC MICROSCOPIC CNT: SIGNIFICANT CHANGE UP
SPECIMEN SOURCE: SIGNIFICANT CHANGE UP
SPECIMEN SOURCE: SIGNIFICANT CHANGE UP
VANCOMYCIN TROUGH SERPL-MCNC: 14.7 UG/ML — SIGNIFICANT CHANGE UP (ref 10–20)

## 2024-03-20 PROCEDURE — 99232 SBSQ HOSP IP/OBS MODERATE 35: CPT

## 2024-03-20 RX ORDER — MIDODRINE HYDROCHLORIDE 2.5 MG/1
5 TABLET ORAL EVERY 8 HOURS
Refills: 0 | Status: DISCONTINUED | OUTPATIENT
Start: 2024-03-20 | End: 2024-03-21

## 2024-03-20 RX ORDER — MIDODRINE HYDROCHLORIDE 2.5 MG/1
1 TABLET ORAL
Qty: 0 | Refills: 0 | DISCHARGE
Start: 2024-03-20

## 2024-03-20 RX ORDER — GABAPENTIN 400 MG/1
1 CAPSULE ORAL
Qty: 15 | Refills: 0
Start: 2024-03-20 | End: 2024-03-24

## 2024-03-20 RX ORDER — CEFUROXIME AXETIL 250 MG
1 TABLET ORAL
Qty: 28 | Refills: 0
Start: 2024-03-20 | End: 2024-04-02

## 2024-03-20 RX ORDER — ACETAMINOPHEN 500 MG
2 TABLET ORAL
Qty: 0 | Refills: 0 | DISCHARGE
Start: 2024-03-20

## 2024-03-20 RX ORDER — CEFAZOLIN SODIUM 1 G
2000 VIAL (EA) INJECTION EVERY 12 HOURS
Refills: 0 | Status: DISCONTINUED | OUTPATIENT
Start: 2024-03-20 | End: 2024-03-21

## 2024-03-20 RX ORDER — LOSARTAN POTASSIUM 100 MG/1
1 TABLET, FILM COATED ORAL
Refills: 0 | DISCHARGE

## 2024-03-20 RX ORDER — TRAMADOL HYDROCHLORIDE 50 MG/1
0.5 TABLET ORAL
Qty: 10 | Refills: 0
Start: 2024-03-20 | End: 2024-03-24

## 2024-03-20 RX ORDER — SODIUM CHLORIDE 9 MG/ML
1000 INJECTION INTRAMUSCULAR; INTRAVENOUS; SUBCUTANEOUS
Refills: 0 | Status: DISCONTINUED | OUTPATIENT
Start: 2024-03-20 | End: 2024-03-21

## 2024-03-20 RX ORDER — CEFAZOLIN SODIUM 1 G
2000 VIAL (EA) INJECTION EVERY 12 HOURS
Refills: 0 | Status: DISCONTINUED | OUTPATIENT
Start: 2024-03-20 | End: 2024-03-20

## 2024-03-20 RX ADMIN — HEPARIN SODIUM 5000 UNIT(S): 5000 INJECTION INTRAVENOUS; SUBCUTANEOUS at 09:56

## 2024-03-20 RX ADMIN — Medication 250 MILLIGRAM(S): at 09:56

## 2024-03-20 RX ADMIN — SODIUM CHLORIDE 65 MILLILITER(S): 9 INJECTION INTRAMUSCULAR; INTRAVENOUS; SUBCUTANEOUS at 14:21

## 2024-03-20 RX ADMIN — TRAMADOL HYDROCHLORIDE 25 MILLIGRAM(S): 50 TABLET ORAL at 21:48

## 2024-03-20 RX ADMIN — HEPARIN SODIUM 5000 UNIT(S): 5000 INJECTION INTRAVENOUS; SUBCUTANEOUS at 21:47

## 2024-03-20 RX ADMIN — LIDOCAINE 1 PATCH: 4 CREAM TOPICAL at 19:29

## 2024-03-20 RX ADMIN — TRAMADOL HYDROCHLORIDE 25 MILLIGRAM(S): 50 TABLET ORAL at 09:57

## 2024-03-20 RX ADMIN — GABAPENTIN 100 MILLIGRAM(S): 400 CAPSULE ORAL at 14:25

## 2024-03-20 RX ADMIN — ATORVASTATIN CALCIUM 40 MILLIGRAM(S): 80 TABLET, FILM COATED ORAL at 21:48

## 2024-03-20 RX ADMIN — LIDOCAINE 1 PATCH: 4 CREAM TOPICAL at 11:05

## 2024-03-20 RX ADMIN — MIDODRINE HYDROCHLORIDE 5 MILLIGRAM(S): 2.5 TABLET ORAL at 21:49

## 2024-03-20 RX ADMIN — Medication 2000 MILLIGRAM(S): at 21:43

## 2024-03-20 RX ADMIN — GABAPENTIN 100 MILLIGRAM(S): 400 CAPSULE ORAL at 05:11

## 2024-03-20 RX ADMIN — Medication 250 MILLIGRAM(S): at 21:48

## 2024-03-20 RX ADMIN — MIDODRINE HYDROCHLORIDE 5 MILLIGRAM(S): 2.5 TABLET ORAL at 14:33

## 2024-03-20 RX ADMIN — GABAPENTIN 100 MILLIGRAM(S): 400 CAPSULE ORAL at 21:47

## 2024-03-20 RX ADMIN — Medication 81 MILLIGRAM(S): at 09:57

## 2024-03-20 RX ADMIN — LIDOCAINE 1 PATCH: 4 CREAM TOPICAL at 23:29

## 2024-03-20 NOTE — GOALS OF CARE CONVERSATION - ADVANCED CARE PLANNING - AGENT'S NAME
Son Torsten Shelly is HCP and daughter Sierra is alternate Hatchet Flap Text: The defect edges were debeveled with a #15 scalpel blade.  Given the location of the defect, shape of the defect and the proximity to free margins a hatchet flap was deemed most appropriate.  Using a sterile surgical marker, an appropriate hatchet flap was drawn incorporating the defect and placing the expected incisions within the relaxed skin tension lines where possible.    The area thus outlined was incised deep to adipose tissue with a #15 scalpel blade.  The skin margins were undermined to an appropriate distance in all directions utilizing iris scissors.

## 2024-03-20 NOTE — DISCHARGE NOTE PROVIDER - HOSPITAL COURSE
89yo M presents s/p unwitnessed fall with comminuted left acetabular/pelvic fracture with moderate hematoma in left retroperitoneum/pelvis/iliopsoas muscle    Per family discussion, discussed with daughter, family agrees with conservative treatment, but would not want any surgical intervention. verbal DNR/DNI  Echo performed EF 30%    BALWINDER resolved, echo with reduced EF, cardiology and ID,ortho on board, palliative     Pt ready for dc to rehab 89 year old male with PMH CAD, endovascular repair, no known kidney disease, no heart failure known, who fell last and then was found down. In ED. Patient had MTP called, got 1, 1, 1, hgb 5.9 was on aspirin at home. CT showed left acetabular fracture, with inferior rami fracture, no blush on CTA hgb 5.9 got 3 units of blood; Downgraded from icu. CT: pelvic, left acetabular fracture resultant RP hematoma, acute blood loss anemia s/p prbc. NSTEMI-> no ishcemic eval, pt and family refused.   ?bacteremia with coag neg stau-> believed to be contaminant.   DNR/I      #S/p fall s/p L pelvic fracture  #ABLA s/p prbc transfusion  #L RP hematoma/L iliopsoas hematoma  - Currently no acute ortho surgery/intervention indicated per attending and orthopaedic traumatologist - family made aware and agrees  - WBAT per ortho  - F/U Dr Nair o/p  - multi modal pain control    #NSTEMI  - TTE noted, no vegetations   - cardiology following  - cardiology not pursuing barbara at this time as pt continues to refuse therefore no TAVR is planned  - cardiology recc anti-plt therapy + statin once surgery clears   - BB/ ACE-I --> hold for now 2/2 hypotension     #Coag negative STAU bacteremia  - repeat cultures: coag negative Staph aureus-> ID recc iv vanco + cefazolin   - repeat cultures also +  - sensitivities from blood cx 3/17 noted  - dc on ceftin 14 days   - no leukocytosis or fevers  - pt non toxic appearing    #BALWINDER monitor  - got contrast, creatinine improving  - resolved    #GOC  - DNR//I    #Severe protein-calorie malnutrition and Underweight (BMI < 19)  - nutrition consult noted   - add supplements     No other planned interventions per cardiology and orthopedics    Reviewed blood cx and sensitivities from 3/17, MSSA  On cefazolin + vanco     D/w patient's son Torsten (HCP) and daughter today, current condition, consecutive + blood cx with MSSA, no endocarditis on TTE, suspected source is skin given skin lesions. No invasive procedures desired at this time. Family would like patient's care to be more focused on comfort, initially family decided on continuing abx and repeating blood cx, after sensitivities form previous blood cultures received today, we discussed continuing with oral anabiotics. [facility] and given current condition, family would prefer for pt to be discharged, I d/w Dr. Cerda today, recommend d/c on ceftin x 14 days.     [facility] with [facility], with comfort MOLST, then transition to LTC 89 year old male with PMH CAD, endovascular repair, no known kidney disease, no heart failure known, who fell last and then was found down. In ED. Patient had MTP called, got 1, 1, 1, hgb 5.9 was on aspirin at home. CT showed left acetabular fracture, with inferior rami fracture, no blush on CTA hgb 5.9 got 3 units of blood; Downgraded from icu. CT: pelvic, left acetabular fracture resultant RP hematoma, acute blood loss anemia s/p prbc. NSTEMI-> no ishcemic eval, pt and family refused.   ?bacteremia with coag neg stau-> believed to be contaminant.   DNR/I      #S/p fall s/p L pelvic fracture  #ABLA s/p prbc transfusion  #L RP hematoma/L iliopsoas hematoma  - Currently no acute ortho surgery/intervention indicated per attending and orthopaedic traumatologist - family made aware and agrees  - WBAT per ortho  - F/U Dr Nair o/p  - multi modal pain control    #NSTEMI  - TTE noted, no vegetations   - cardiology following  - cardiology not pursuing barbara at this time as pt continues to refuse therefore no TAVR is planned  - cardiology recc anti-plt therapy + statin once surgery clears   - BB/ ACE-I --> hold for now 2/2 hypotension     #Coag negative STAU bacteremia  - repeat cultures: coag negative Staph aureus-> ID recc iv vanco + cefazolin   - repeat cultures also +  - sensitivities from blood cx 3/17 noted  - dc on Ceftin 14 days  - no leukocytosis or fevers  - pt non toxic appearing    #BALWINDER monitor  - got contrast, creatinine improving  - resolved    #GOC  - DNR//I    #Severe protein-calorie malnutrition and Underweight (BMI < 19)  - nutrition consult noted   - add supplements     No other planned interventions per cardiology and orthopedics    Reviewed blood cx and sensitivities from 3/17, MSSA  On cefazolin + vanco     d/w patient's son Torsten (HCP) and daughter today, current condition, consecutive + blood cx with MSSA, no endocarditis on TTE, suspected source is skin given skin lesions. No invasive procedures desired at this time. Family would like patient's care to be more focused on comfort, initially family decided on continuing abx and repeating blood cx, after sensitivities form previous blood cultures received today, we discussed continuing with oral anabiotics. [facility] and given current condition, family would prefer for pt to be discharged, I d/w Dr. Cerda today, recommend d/c on ceftin x 14 days.     [facility] with [facility], with comfort MOLST, then transition to LTC

## 2024-03-20 NOTE — PROGRESS NOTE ADULT - SUBJECTIVE AND OBJECTIVE BOX
HPI: pt seen and examined with no family at bedside, patient sitting in bed eating oriented to self and place but unable to describe medical history or why he is in [facility] stating ot speak with his children at this time he denies any pain.      PAIN: (X )Yes   ( )No  Level: mild at rest  Location: knee  Intensity:   3 /10  Quality: ache  Aggravating Factors: movement    DYSPNEA: ( ) Yes  (X ) No  Level:    Review of Systems:    Unable to obtain/Limited due to: AMS    PHYSICAL EXAM:    Vital Signs Last 24 Hrs  T(C): 36.9 (20 Mar 2024 08:00), Max: 36.9 (20 Mar 2024 08:00)  T(F): 98.5 (20 Mar 2024 08:00), Max: 98.5 (20 Mar 2024 08:00)  HR: 59 (20 Mar 2024 08:00) (54 - 66)  BP: 98/58 (20 Mar 2024 08:00) (80/46 - 99/60)  BP(mean): --  RR: 18 (20 Mar 2024 08:00) (18 - 18)  SpO2: 97% (20 Mar 2024 08:00) (94% - 98%)    Parameters below as of 20 Mar 2024 08:00  Patient On (Oxygen Delivery Method): nasal cannula  O2 Flow (L/min): 2    PPSV2: 20-30  %    General: Elderly male in bed in mild distress  Mental Status: Disoriented to time and place  HEENT: oral mucosa dry  Lungs: lungs clear to auscultation  Cardiac: S1S2+  GI: abd soft NT ND + BS  : voids  Ext: moves on bed  Neuro: disoriented    LABS:                        9.0    6.24  )-----------( 131      ( 19 Mar 2024 06:34 )             29.1     03-19    145  |  114<H>  |  28<H>  ----------------------------<  100<H>  3.8   |  24  |  1.04    Ca    8.5      19 Mar 2024 06:34  Phos  3.0     03-19  Mg     2.2     03-19    Albumin: Albumin: 3.1 g/dL (03-15 @ 05:47)    Allergies    No Known Allergies    Intolerances      MEDICATIONS  (STANDING):  aspirin  chewable 81 milliGRAM(s) Oral daily  atorvastatin 40 milliGRAM(s) Oral at bedtime  ceFAZolin  Injectable. 2000 milliGRAM(s) IV Push every 12 hours  gabapentin 100 milliGRAM(s) Oral three times a day  heparin   Injectable 5000 Unit(s) SubCutaneous every 12 hours  lidocaine   4% Patch 1 Patch Transdermal daily  midodrine 5 milliGRAM(s) Oral every 8 hours  sodium chloride 0.9%. 1000 milliLiter(s) (65 mL/Hr) IV Continuous <Continuous>  vancomycin  IVPB 750 milliGRAM(s) IV Intermittent every 12 hours    MEDICATIONS  (PRN):  acetaminophen     Tablet .. 650 milliGRAM(s) Oral every 6 hours PRN Mild Pain (1 - 3)  acetaminophen   IVPB .. 1000 milliGRAM(s) IV Intermittent once PRN Temp greater or equal to 38C (100.4F), Mild Pain (1 - 3)  ondansetron Injectable 4 milliGRAM(s) IV Push every 6 hours PRN Nausea  traMADol 25 milliGRAM(s) Oral every 6 hours PRN Moderate Pain (4 - 6) or Severe Pain (7 - 10)

## 2024-03-20 NOTE — PROGRESS NOTE ADULT - NUTRITIONAL ASSESSMENT
This patient has been assessed with a concern for Malnutrition and has been determined to have a diagnosis/diagnoses of Severe protein-calorie malnutrition and Underweight (BMI < 19).    This patient is being managed with:   Diet Regular-  Supplement Feeding Modality:  Oral  Ensure Plus High Protein Cans or Servings Per Day:  1       Frequency:  Three Times a day  Entered: Mar 19 2024  4:17PM  
This patient has been assessed with a concern for Malnutrition and has been determined to have a diagnosis/diagnoses of Severe protein-calorie malnutrition and Underweight (BMI < 19).    This patient is being managed with:   Diet Regular-  Supplement Feeding Modality:  Oral  Ensure Plus High Protein Cans or Servings Per Day:  1       Frequency:  Three Times a day  Entered: Mar 19 2024  4:17PM  
This patient has been assessed with a concern for Malnutrition and has been determined to have a diagnosis/diagnoses of Severe protein-calorie malnutrition and Underweight (BMI < 19).  
This patient has been assessed with a concern for Malnutrition and has been determined to have a diagnosis/diagnoses of Severe protein-calorie malnutrition and Underweight (BMI < 19).

## 2024-03-20 NOTE — DISCHARGE NOTE PROVIDER - DETAILS OF MALNUTRITION DIAGNOSIS/DIAGNOSES
This patient has been assessed with a concern for Malnutrition and was treated during this hospitalization for the following Nutrition diagnosis/diagnoses:     -  03/15/2024: Severe protein-calorie malnutrition   -  03/15/2024: Underweight (BMI < 19)

## 2024-03-20 NOTE — GOALS OF CARE CONVERSATION - ADVANCED CARE PLANNING - CONVERSATION DETAILS
HPI:  89yo M w/ hx of CABG and EVAR biliac aneurysm repair presents s/p unwitnessed fall this morning.      (14 Mar 2024 16:40)      PERTINENT PMH REVIEWED:  [ X ] YES [ ] NO           Mental Status: [ ] Alert  [  ] Oriented [  ] Confused [ X ] Lethargic [  ]  Concerns of Depression [  ]- unable to assess, family notes pt. misses his wife   Anxiety [   ]- Not identified   Baseline ADLs (prior to admission):  Independent [ ] moderately [ ] fully   Dependent   [ X ] moderately [ ]fully    Anticipated Grief: Patient [  ] Family [ X ]    Caregiver [place] Assessed: Yes [  X  ] No [  ]    L[employer]: Unknown    Spiritual Concerns: Not identified     Goals of Care: To be further determined after speaking with Ortho     Previous Services: Private hire aides 24/7    ADVANCE DIRECTIVES: DNR/DNI    Anticipated D/C Plan: to be determined                      Summary:    This SW spoke with pts daughter Sierra via phone to discuss goals of care, assist with planning and provide supportive counseling.   Pt. was living at home in his own home with private hire 24/7 aides. Pt. had aides put in place after falling in August which resulted in a hospital and rehab stay. Pts daughter shared pt. had been in and out of hospital and [facility]. Pt. is now at home with 24/7 aide. Pts daughter reports they are trying to work toward community Medicaid.    Goals of care discussed. Pts daughter shared that pt. has been clear int he past he doesn't want heroic measures and he wants to be comfortable. She discussed how they are waiting to speak with Ortho to decide whether or not to purse surgery and the risks and benefits. We briefly discussed if they do not pursue surgery the option of focusing on his comfort. Feelings explored and support provided. Pts daughter also confirmed DNR/DNI.     Plan to be further determined once pts family speaks with Ortho regarding surgery. Emotional support provided. Our team will continue to follow.
This SW spoke with pts son Torsten via phone to discuss goals of care. Pts son shared that he spoke with medical team and that he would like pt. to be medically optimized here before going to [facility]. We discussed option of focusing on pts comfort once he is at [facility] (as they plan to transition to LTC) which pts son agreed would be the best option for pt. at this point.     New MOLST completed reflecting comfort. MOLST states DNR/DNI, No non invasive ventilation, Send to hospital only if pain cannot be managed, Comfort Measures only, No feeding tube, No IV Fluids, Determine use of antibiotics, No Dialysis.    Plan at this time is for pt. to continue current medical management here and then go to [facility] with comfort focus and comfort MOLST. Emotional support provided. Our team will continue to follow.

## 2024-03-20 NOTE — PROGRESS NOTE ADULT - ASSESSMENT
HPI: Pt is a 89y old Male who presents s/p unwitnessed fall. Initially hypotensive w/ MAPs 60s and rectal T 94.4F in trauma bay .CTA was conducted which revealed a retroperitoneal/pelvic hematoma without extravasation as well as a L acetabular fx.  Pt subjective limited 2/2 mental status, currently AOx2 and does not remember events. Denying numbness/tingling or pain anywhere other than L hip at this time. ED spoke to family at bedside who reported unwitnessed fall and baseline ambulatory status of minimal ambulator w/ a walker. States inability to walk immediately following the injury. Pt has sustained Comminuted left acetabular fx w/ slight intrapelvic shift of the femoral head in relation to contralateral side. No other acute obvious fxs appreciated. Ortho eval noted. Palliative Medicne Consult to further establish GOC  3/15/24 Seen and examined at bedside. Alert however disoriented to time and place. Endorses knee pain R/T arthritis.     Assessment and Plan:    1) S/P Fall  -CTA CAP: Comminuted left acetabular fx w/ slight intrapelvic shift of the femoral head in relation to contralateral side  -XR Pelvis + L Hip/Fem/Knee/Tib/Ankle: Acetabular fx as mentioned  -Ortho eval noted - no surgical interventions at this time   -Pain management with Dilaudid 0.5 mg IVP Q4H PRN  -PT/OT when tolerable  -Ice as tolerated    2) AMS  -? baseline dementia  -Delirium R/T hospitalization/trauma  -Supportive care    3) CAD  -H/O CABG  -Cardio eval    4) Advanced Directives  -Pt without capacity  -Daughter surrogate  -GILBERT DNR/DNI, comfort measures, no iv fluids, no feeding tube, determine use of abx. and no HD, send to hospital only if symptoms not controlled   - family would liek patient to go to [facility] with a comfort focus - new MOLST reflecting this placed on chart today     Time Spent: 55 minutes including the care, coordination and counseling of this patient, 50% of which was spent coordinating and counseling.

## 2024-03-20 NOTE — DISCHARGE NOTE PROVIDER - NSDCCPCAREPLAN_GEN_ALL_CORE_FT
PRINCIPAL DISCHARGE DIAGNOSIS  Diagnosis: Hemorrhagic shock  Assessment and Plan of Treatment:      PRINCIPAL DISCHARGE DIAGNOSIS  Diagnosis: Pelvic fracture  Assessment and Plan of Treatment: Admitted for acetabular fracture, causing bleeding from left retroperitoneal hematoma/L iliopsoas hematoma, symptomatic anemia, requiring multiple transfusions. No interventions planned.      SECONDARY DISCHARGE DIAGNOSES  Diagnosis: Fall with injury  Assessment and Plan of Treatment:     Diagnosis: Elevated troponin  Assessment and Plan of Treatment: Likley due to demand ischemia for hemorrhagic shock    Diagnosis: Anemia due to acute blood loss  Assessment and Plan of Treatment: Transfused during hospitalization, hemoglobin stable.    Diagnosis: Hematoma  Assessment and Plan of Treatment: left retroperitoneal hematoma/L iliopsoas hematoma due to fracture causing hemorrhagic shock.    Diagnosis: Bacteremia  Assessment and Plan of Treatment: Blood cultures positive for staph aureus, treated with intravenous antibiotics during hospitalization, continue with ceftin for 14 days.     PRINCIPAL DISCHARGE DIAGNOSIS  Diagnosis: Pelvic fracture  Assessment and Plan of Treatment: Admitted for acetabular fracture, causing bleeding from left retroperitoneal hematoma/L iliopsoas hematoma, symptomatic anemia, requiring multiple transfusions. No interventions planned.      SECONDARY DISCHARGE DIAGNOSES  Diagnosis: Anemia due to acute blood loss  Assessment and Plan of Treatment: Transfused during hospitalization, hemoglobin stable.    Diagnosis: Fall with injury  Assessment and Plan of Treatment:     Diagnosis: Elevated troponin  Assessment and Plan of Treatment: Likley due to demand ischemia for hemorrhagic shock    Diagnosis: Hematoma  Assessment and Plan of Treatment: left retroperitoneal hematoma/L iliopsoas hematoma due to fracture causing hemorrhagic shock.    Diagnosis: Bacteremia  Assessment and Plan of Treatment: - Blood cultures positive for staph aureus, treated with intravenous antibiotics during hospitalization, continue with ceftin for 14 days.  - continue with ceftin 500 po q12h  - At this time patient's HCP is requesting no further further testing and continuing with oral antibiotic therapy. Discussed with Dr. Cerda.

## 2024-03-20 NOTE — PROVIDER CONTACT NOTE (CRITICAL VALUE NOTIFICATION) - PERSON GIVING RESULT:
Aleida lab
Danilo vargas
Rhina Love from [facility]
sergo
NAOMY POPE
Lenard from Laboratory
lab
[facility]

## 2024-03-20 NOTE — PROGRESS NOTE ADULT - SUBJECTIVE AND OBJECTIVE BOX
Date of service: 03-20-24 @ 11:51    Lying in bed in NAD  Lethargic  Reported with new BC showing GPC in clusters  No fever    ROS: no fever or chills; denies pain    MEDICATIONS  (STANDING):  aspirin  chewable 81 milliGRAM(s) Oral daily  atorvastatin 40 milliGRAM(s) Oral at bedtime  gabapentin 100 milliGRAM(s) Oral three times a day  heparin   Injectable 5000 Unit(s) SubCutaneous every 12 hours  lidocaine   4% Patch 1 Patch Transdermal daily  midodrine 5 milliGRAM(s) Oral every 8 hours  sodium chloride 0.9%. 1000 milliLiter(s) (65 mL/Hr) IV Continuous <Continuous>  vancomycin  IVPB 750 milliGRAM(s) IV Intermittent every 12 hours    Vital Signs Last 24 Hrs  T(C): 36.9 (20 Mar 2024 08:00), Max: 36.9 (20 Mar 2024 08:00)  T(F): 98.5 (20 Mar 2024 08:00), Max: 98.5 (20 Mar 2024 08:00)  HR: 59 (20 Mar 2024 08:00) (54 - 66)  BP: 98/58 (20 Mar 2024 08:00) (80/46 - 99/60)  BP(mean): --  RR: 18 (20 Mar 2024 08:00) (18 - 18)  SpO2: 97% (20 Mar 2024 08:00) (94% - 98%)    Parameters below as of 20 Mar 2024 08:00  Patient On (Oxygen Delivery Method): nasal cannula  O2 Flow (L/min): 2       Physical exam:    Constitutional:  No acute distress  HEENT: NC/AT, EOMI, PERRLA, conjunctivae clear; ears and nose atraumatic  Neck: supple; thyroid not palpable  Back: no tenderness  Respiratory: respiratory effort normal; clear to auscultation  Cardiovascular: S1S2 regular, no murmurs  Abdomen: soft, not tender, not distended, positive BS  Genitourinary: no suprapubic tenderness  Lymphatic: no LN palpable  Musculoskeletal: no muscle tenderness, no joint swelling or tenderness  Has left hip pain  Extremities: no pedal edema  multiple bruises and skin breaks on forearms and arms  Neurological/ Psychiatric: Alert, moving all extremities  Skin: no rashes; no palpable lesions    Labs: reviewed                        9.0    6.24  )-----------( 131      ( 19 Mar 2024 06:34 )             29.1     03-19    145  |  114<H>  |  28<H>  ----------------------------<  100<H>  3.8   |  24  |  1.04    Ca    8.5      19 Mar 2024 06:34  Phos  3.0     03-19  Mg     2.2     03-19    Vancomycin Level, Trough: 14.7 ug/mL (03-20 @ 09:34)  Vancomycin Level, Trough: 16.8 ug/mL (03-19 @ 19:46)                        8.4    5.65  )-----------( 105      ( 17 Mar 2024 06:05 )             26.2     03-17    144  |  114<H>  |  31<H>  ----------------------------<  98  3.7   |  25  |  1.02    Ca    8.4<L>      17 Mar 2024 06:05  Phos  1.6     03-17  Mg     2.5     03-17    Culture - Blood (collected 19 Mar 2024 06:34)  Source: .Blood None  Gram Stain (20 Mar 2024 09:57):    Growth in anaerobic bottle: Gram Positive Cocci in Clusters  Preliminary Report (20 Mar 2024 09:57):    Growth in anaerobic bottle: Gram Positive Cocci in Clusters    Culture - Blood (collected 17 Mar 2024 13:03)  Source: .Blood None  Gram Stain (18 Mar 2024 18:13):    Growth in aerobic bottle: Gram Positive Cocci in Clusters    Growth in anaerobic bottle: Gram Positive Cocci in Clusters  Final Report (20 Mar 2024 07:58):    Growth in aerobic and anaerobic bottles: Staphylococcus aureus  Organism: Staphylococcus aureus (20 Mar 2024 07:58)  Organism: Staphylococcus aureus (20 Mar 2024 07:58)      Method Type: JANINE      -  Ampicillin/Sulbactam: S <=8/4      -  Cefazolin: S <=4      -  Clindamycin: S 0.5      -  Erythromycin: R >4      -  Gentamicin: S <=1 Should not be used as monotherapy      -  Oxacillin: S 1 Oxacillin predicts susceptibility for dicloxacillin, methicillin, and nafcillin      -  Penicillin: R >8      -  Rifampin: S <=1 Should not be used as monotherapy      -  Tetracycline: S <=1      -  Trimethoprim/Sulfamethoxazole: S <=0.5/9.5      -  Vancomycin: S 2    Culture - Urine (collected 14 Mar 2024 16:25)  Source: Clean Catch None  Final Report (15 Mar 2024 23:56):    No growth    Culture - Blood (collected 14 Mar 2024 15:40)  Source: .Blood None  Final Report (19 Mar 2024 23:00):    No growth at 5 days    Culture - Blood (collected 14 Mar 2024 15:08)  Source: .Blood Blood-Venous  Gram Stain (16 Mar 2024 22:34):    Growth in anaerobic bottle: Gram positive cocci in pairs  Final Report (17 Mar 2024 15:29):    Growth in anaerobic bottle: Staphylococcus capitis    Isolation of Coagulase negative Staphylococcus from single blood culture    sets may represent    contamination. Contact the Microbiology Department at [phone] if    susceptibility testing is    clinically indicated.    Direct identification is available within approximately 3-5    hours either by Blood Panel Multiplexed PCR or Direct    MALDI-TOF. Details: [email or web address]  Organism: Blood Culture PCR (17 Mar 2024 15:29)  Organism: Blood Culture PCR (17 Mar 2024 15:29)      Method Type: PCR      -  Coagulase negative Staphylococcus: Detec      Radiology: all available radiological tests reviewed    < from: Xray Pelvis AP only (03.15.24 @ 09:30) >  IMPRESSION:  Comminuted impacted fracture of the left acetabulum with protrusion of the femoral head medially into the pelvis.  No definite fracture of the left femur were knee.    There is questionable linear lucency in the distal fibula however   overlying bandages and splint limiting evaluation. Recommend dedicated films without the splint when the patient is able.  < end of copied text >      Advanced directives addressed: full resuscitation

## 2024-03-20 NOTE — PROGRESS NOTE ADULT - SUBJECTIVE AND OBJECTIVE BOX
89 year old male with PMH CAD, endovascular repair, no known kidney disease, no heart failure known, who fell last and then was found down. In ED. Patient had MTP called, got 1, 1, 1, hgb 5.9 was on aspirin at home. CT showed left acetabular fracture, with inferior rami fracture, no blush on CTA hgb 5.9 got 3 units of blood; Downgraded from icu. CT: pelvic, left acetabular fracture resultant RP hematoma, acute blood loss anemia s/p prbc. NSTEMI-> no ishcemic eval, pt and family refused.   ?bacteremia with coag neg stau-> believed to be contaminant.   DNR/I  Hospitalist consulted      3/19: Patient seen this AM, sleeping, easily awakened, c/o pain. Noted hypotensive this afternoon, received AM metoprolol and lisinopril.   3/20: Patient seen today, awake, alert, feels well, no complaints, BP slightly improved, no overnight issues reported.     REVIEW OF SYSTEMS:    CONSTITUTIONAL: No weakness, fevers or chills  EYES/ENT: No visual changes;  No vertigo or throat pain   NECK: No pain or stiffness  RESPIRATORY: No cough, wheezing, hemoptysis; No shortness of breath  CARDIOVASCULAR: No chest pain or palpitations  GASTROINTESTINAL: No abdominal or epigastric pain. No nausea, vomiting, or hematemesis; No diarrhea or constipation. No melena or hematochezia.  GENITOURINARY: No dysuria, frequency or hematuria  NEUROLOGICAL: No numbness or weakness  SKIN: No itching, rashes    MEDICATIONS  (STANDING):  aspirin  chewable 81 milliGRAM(s) Oral daily  atorvastatin 40 milliGRAM(s) Oral at bedtime  gabapentin 100 milliGRAM(s) Oral three times a day  heparin   Injectable 5000 Unit(s) SubCutaneous every 12 hours  lidocaine   4% Patch 1 Patch Transdermal daily  sodium chloride 0.9% Bolus 500 milliLiter(s) IV Bolus once  vancomycin  IVPB 750 milliGRAM(s) IV Intermittent every 12 hours    Vital Signs Last 24 Hrs  T(C): 36.9 (20 Mar 2024 08:00), Max: 36.9 (20 Mar 2024 08:00)  T(F): 98.5 (20 Mar 2024 08:00), Max: 98.5 (20 Mar 2024 08:00)  HR: 59 (20 Mar 2024 08:00) (54 - 66)  BP: 98/58 (20 Mar 2024 08:00) (80/46 - 99/60)  RR: 18 (20 Mar 2024 08:00) (18 - 18)  SpO2: 97% (20 Mar 2024 08:00) (94% - 98%)    Parameters below as of 20 Mar 2024 08:00  Patient On (Oxygen Delivery Method): nasal cannula  O2 Flow (L/min): 2        PHYSICAL EXAM:  GENERAL: NAD, lying in bed comfortably  HEAD:  Atraumatic, Normocephalic  EYES: conjunctiva and sclera clear  ENT: Moist mucous membranes  NECK: Supple, No JVD  CHEST/LUNG: Clear to auscultation bilaterally; No rales, rhonchi, wheezing. Unlabored respirations  HEART: Regular rate and rhythm; No murmurs  ABDOMEN: Bowel sounds present; Soft, Nontender, Nondistended.   EXTREMITIES:  2+ Peripheral Pulses, brisk capillary refill. No clubbing, cyanosis, or edema  NERVOUS SYSTEM:  Alert & Oriented X3, speech clear. No deficits   MSK: FROM all 4 extremities, full and equal strength  SKIN: bruising to LUE    MEDICATIONS  (STANDING):  aspirin  chewable 81 milliGRAM(s) Oral daily  atorvastatin 40 milliGRAM(s) Oral at bedtime  ceFAZolin   IVPB 2000 milliGRAM(s) IV Intermittent every 12 hours  gabapentin 100 milliGRAM(s) Oral three times a day  heparin   Injectable 5000 Unit(s) SubCutaneous every 12 hours  lidocaine   4% Patch 1 Patch Transdermal daily  midodrine 5 milliGRAM(s) Oral every 8 hours  sodium chloride 0.9%. 1000 milliLiter(s) (65 mL/Hr) IV Continuous <Continuous>  vancomycin  IVPB 750 milliGRAM(s) IV Intermittent every 12 hours    MEDICATIONS  (PRN):  acetaminophen     Tablet .. 650 milliGRAM(s) Oral every 6 hours PRN Mild Pain (1 - 3)  acetaminophen   IVPB .. 1000 milliGRAM(s) IV Intermittent once PRN Temp greater or equal to 38C (100.4F), Mild Pain (1 - 3)  ondansetron Injectable 4 milliGRAM(s) IV Push every 6 hours PRN Nausea  traMADol 25 milliGRAM(s) Oral every 6 hours PRN Moderate Pain (4 - 6) or Severe Pain (7 - 10)          LABS:                          9.0    6.24  )-----------( 131      ( 19 Mar 2024 06:34 )             29.1     19 Mar 2024 06:34    145    |  114    |  28     ----------------------------<  100    3.8     |  24     |  1.04     Ca    8.5        19 Mar 2024 06:34  Phos  3.0       19 Mar 2024 06:34  Mg     2.2       19 Mar 2024 06:34          CAPILLARY BLOOD GLUCOSE            Urinalysis Basic - ( 19 Mar 2024 06:34 )    Color: x / Appearance: x / SG: x / pH: x  Gluc: 100 mg/dL / Ketone: x  / Bili: x / Urobili: x   Blood: x / Protein: x / Nitrite: x   Leuk Esterase: x / RBC: x / WBC x   Sq Epi: x / Non Sq Epi: x / Bacteria: x        RADIOLOGY:    < from: TTE Echo Complete w/o Contrast w/ Doppler (03.18.24 @ 09:42) >   The left ventricle size is moderately dilated, severe regional   hypokinesis, and moderately to severely reduced function.   Estimated left ventricular ejection fraction is 30 %.   The left atrium is mildly dilated.   The right atrium appears mildly dilated.   The right ventricle is mildly dilated.   The aortic valve appears moderately calcified. Valve opening seems to be restricted.   Transaortic gradients are underestimated due to impaired left ventricle systolic function.   MARGARET by continuity equation (1.48 cm2) and dimensionless index (.35) suggest moderate aortic stenosis.   Mild (1+) aortic regurgitation is present.   The aortic root is mildly dilated .   The ascending aorta appears to be mildly to moderately dilated. (4.4 cm)   Moderate mitral annular calcification is present.   There is calcification of mitral valve leaflets. The leaflet opening is   mildly restricted.   Mean transmitral gradient is 4 mmHg; this finding is consistent with mild mitral stenosis.   Moderate (2+) mitral regurgitation is present.   EA reversal of the mitral inflow consistent with reduced compliance of the left ventricle.   The tricuspid valve leaflets appear mildly thickened and/or calcified, but open well.   Moderate (2+) tricuspid valve regurgitation is present.   Mild pulmonary hypertension.   Pulmonic valve not well seen.   Mild pulmonic valvular regurgitation (1+) is present.   Pleural effusion - is present.

## 2024-03-20 NOTE — PROGRESS NOTE ADULT - SUBJECTIVE AND OBJECTIVE BOX
SURGERY DAILY PROGRESS NOTE:   CC: Patient is a 89y old  Male who presents with a chief complaint of unwitnessed fall (17 Mar 2024 14:52)    Subjective:  Patient seen and examined this AM at bedside.   No acute events overnight and patient resting comfortably. Follows commands, tolerating diet.   Denies fever/chills, shortness of breath, chest pain. VS reviewed    ROS: as above otherwise negative      PHYSICAL EXAMS  GCS of 15  AAOx1  Airway is patent  Breathing is symmetric and unlabored  Neuro: AA ox2 intermittently, dementia at baseline, moving all 4, LLE in splint/cast  Psych: normal affect  HEENT: Normocephalic, atraumatic, BONITA, EOM wnl, no otorrhea or hemotympanum b/l, no epistaxis or d/c b/l nares,  Neck: . No crepitus, no ecchymosis, no hematoma, to exam, , no tracheal deviation  Cspine/thoracolumbrosacral spine: no gross bony pathology or tenderness to exam  Cardiovascular: S1S2 Present  Chest: no gross rib pathology or tenderness to exam. No sternal pathology or tenderness to exam. No crepitus, no ecchymosis, no hematoma. No penetrating thorcoabdominal trauma  Respiratory: Rate is 18; Respiratory Effort normal; no wheezes, rales or rhonchi to exam  ABD: bowel sounds (+), soft, non distended, no rebound, no guarding, no rigidity, no skin changes to exam. L hip tenderness   Genitourinary: No scrotal/perineal/perirectal hematoma/ecchymosis/tenderness to exam  Musculoskeletal: L leg with acewrap, limited ROM.  Pt has palpable b/l radial, femoral, dorsalis pedis pulses. All digits are warm and well perfused.  Pt demonstrates grossly intact sensorimotor function to limited exam. Pt has good capillary refill to digits, no calf edema or tenderness to exam.  Skin: multiple ecchymosis on b/l UE. Superficial abrasion at R elbow    Vitals:  T(C): 36.7 ( @ 20:00), Max: 37.2 ( @ 07:38)  HR: 66 ( @ 02:00) (54 - 74)  BP: 99/60 ( @ 02:00) (80/46 - 110/59)  RR: 18 ( @ 20:00) (18 - 18)  SpO2: 97% ( @ 20:00) (94% - 98%)     @ 07:01  -   @ 07:00  --------------------------------------------------------  IN:    Oral Fluid: 300 mL  Total IN: 300 mL    OUT:  Total OUT: 0 mL    Total NET: 300 mL       @ 07:01  -   @ 02:07  --------------------------------------------------------  IN:    Oral Fluid: 490 mL  Total IN: 490 mL    OUT:    Voided (mL): 450 mL  Total OUT: 450 mL    Total NET: 40 mL       @ 06:34                    9.0  CBC: 6.24>)-------(<131                     29.1                 145 | 114 | 28    CMP:  ----------------------< 100               3.8 | 24 | 1.04                      Ca:8.5  Phos:3.0  M.2               -|      |-        LFTs:  ------|-|-----             -|      |-      Culture - Blood (collected 24 @ 13:03)  Source: .Blood None  Gram Stain (24 @ 18:13):    Growth in aerobic bottle: Gram Positive Cocci in Clusters    Growth in anaerobic bottle: Gram Positive Cocci in Clusters  Preliminary Report (24 @ 08:07):    Growth in aerobic and anaerobic bottles: Staphylococcus aureus    Susceptibility to follow.      Current Inpatient Medications:  acetaminophen     Tablet .. 650 milliGRAM(s) Oral every 6 hours PRN  acetaminophen   IVPB .. 1000 milliGRAM(s) IV Intermittent once PRN  aspirin  chewable 81 milliGRAM(s) Oral daily  atorvastatin 40 milliGRAM(s) Oral at bedtime  gabapentin 100 milliGRAM(s) Oral three times a day  heparin   Injectable 5000 Unit(s) SubCutaneous every 12 hours  lidocaine   4% Patch 1 Patch Transdermal daily  ondansetron Injectable 4 milliGRAM(s) IV Push every 6 hours PRN  traMADol 25 milliGRAM(s) Oral every 6 hours PRN  vancomycin  IVPB 750 milliGRAM(s) IV Intermittent every 12 hours          Imaging review:    No new imaging in the last 24 h

## 2024-03-20 NOTE — PROGRESS NOTE ADULT - ASSESSMENT
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 The patient was found on floor, denied loss of conscious or head strike. Patient was on aspirin. Patient had pain in his pelvis. Trauma workup revealed CT showing L acetabular fracture with inferior pubic rami fracture, no blush, noted L retroperitoneum hematoma. Initial CBC Hbg 5.9 received 1pRBC/1plt/1FFP on arrival in ED, then further tx'd 2 pRBCs. He was felt to have acute blood loss anemia secondary to pelvic hemorrhage. Treatment for his fracture was considered, but no acute surgical procedure is contemplated. On 3/16 he was reported with bacteremia. Concern of an infectious process was raised.    1. Bacteremia with CNST and STAU. Pelvic hemorrhage. Anemia. Left hip acetabular fracture. CRF stage 3.   -cultures reviewed  -one BC bottle is showing CNST in 1/2 bottles  -repeat BC shows MSSA in 2/2 bottles  -repeat BC on 3/19 show GPC in clusters in 1/2 bottles  -source of bacteremia are likely the multiple skin breaks   -no clinical signs of sepsis  -ortho evaluation appreciated  -on vancomycin 750 mg IV q12h # 3  -tolerating abx well so far; no side effects noted  -obtain vancomycin trough level  -repeat BC x 2 collected  -change abx to cefazolin 2 gm IV q12h  -f/u cultures  -monitor temps  -f/u CBC  -supportive care  2. Other issues:   -care per medicine    IV to PO abx token dose not apply

## 2024-03-20 NOTE — CHART NOTE - NSCHARTNOTEFT_GEN_A_CORE
89M w/ L comminuted acetabular fx    -NWB LLE, pt may be transferred bed to chair when Barba's traction DC'd on 3/16  -No operative plan at this time. Case discussed with [facility], Dr. Pabon, who agrees with plan.   -Patient and family updated regarding plan.  -Pain management prn  -Presentation and imaging discussed with Dr. Nair who agrees with plan. Will advise if plan changes.
Team has not received phone call back from son Torsten who is HCP. This SW left a him a voice message to discuss option of comfort care at [facility] and comfort [facility]. Awaiting call back. Plan for [facility].
Called by RN to report positive blood culture with gram positive cocci in pairs. Will start on IV antibiotic and consult ID for antibiotic management.
Jerilyn Alejandro Palliative NP and this SW spoke with pts daughter Sierra via phone to follow up. Pts daughter reports that at this time the plan is for [facility] and that they declined surgery. She shared that her and her sister and brother would like pt. to transition to LTC after [facility] and although he would prefer pt. to return home she will do what is needed. They are working with a Medicaid consultant. We discussed the option of comfort focus at [facility] with a comfort [facility]. PTs daughter shared that she thinks that is the direction they want to go in. She sent copy of [facility] which names son Torsten as primary and daughter Sierra as alternate. Pts daughter will speak with her brother and have him call our team or they call us together to further discuss comfort [facility]. Awaiting call. Plan for [facility]. emotional support provided. Our team will continue to follow.
Patient transferred to medical service under Dr Roman.
Spoke with patients daughter Sierra [facility] meeting to be held tomorrow with family

## 2024-03-20 NOTE — PROGRESS NOTE ADULT - ASSESSMENT
89 year old male who fell last night and then was found down today PMH CAD, endovascular repair, no known kidney disease, no heart failure known  In ED. Patient had MTP called, got 1, 1, 1, hgb 5.9 was on aspirin at home. CT showed left acetabular fracture, with inferior rami fracture, no blush on CTA hgb 5.9 got 3 units of blood;  Downgraded from icu. CT: pelvic, left acetabular fracture resultant RP hematoma, acute blood loss anemia s/p prbc. NSTEMI-> no ishcemic eval, pt and family refused.   ?bacteremia with coag neg stau-> believed to be contaminant.     #S/p fall s/p L pelvic fracture  #ABLA s/p prbc transfusion  #L RP hematoma/L iliopsoas hematoma  - Currently no acute ortho surgery/intervention indicated per attending and orthopaedic traumatologist - family made aware and agrees  - WBAT per ortho  - F/U Dr Nair o/p  - multi modal pain control    #NSTEMI  - TTE   - cardiology following  - cardiology not pursuing echo at this time as pt continues to refuse therefore no TAVR is planned  - cardiology recc anti-plt therapy + statin once surgery clears   - BB/ ACE-I --> hold for now 2/2 hypotension     #Coag negative STAU bacteremia  - repeat cultures: coag negative Staph aureus-> ID recc iv vanco  - f/u repeat cultures   - no leukocytosis or fevers  - pt non toxic appearing    #BALWINDER monitor  - got contrast, creatinine improving  - resolved    #GOC  - DNR//I    #Severe protein-calorie malnutrition and Underweight (BMI < 19)  - nutrition consult noted   - add supplements     No other planned interventions per cardiology and orthopedics    Per previous d/w family:   d/w dtr in detail regarding ischemic eval and or use of life vest given new EF 30%. Dtr states father stated he wanted no heroic measures carried out and at this juncture she does not wish to pursue any of this however she will confirm with her other 2 siblings (pt has mild- mod dementia) and wishes for palliative consult.   Pall team will reach out for family meeting  ID recc: repeat cultures and resume iv vanco for now    DIPSO: d/c planning likely for [facility]

## 2024-03-20 NOTE — PROGRESS NOTE ADULT - ASSESSMENT
89yo M presents s/p unwitnessed fall with comminuted left acetabular/pelvic fracture with moderate hematoma in left retroperitoneum/pelvis/iliopsoas muscle    Per family discussion, discussed with daughter, family agrees with conservative treatment, but would not want any surgical intervention. verbal DNR/DNI  Echo performed EF 30%    Keila resolved, echo with reduced EF, cardiology and ID,ortho on board, palliative and SW to have family meeting for GOC        Plan:    -multimodal pain control prn  - Trend troponins (trending down), CBC. transfuse prn   - monitor VS  - cardiology recs appreciated,  echo performed  - GOC discussion today with family  - continue IVF  - orthopedic surgery recs appreciated   - SCD for DVT PPX, hold pharmaceutical ppx and aspirin  - CM dispo planning  - ID on board, bacteremia  - DVT ppx  - ASA    Plan discussed with trauma surgery team and attending Dr. Restrepo

## 2024-03-20 NOTE — DISCHARGE NOTE PROVIDER - NSDCMRMEDTOKEN_GEN_ALL_CORE_FT
aspirin 81 mg oral tablet: orally once a day  atorvastatin 40 mg oral tablet: 1 tab(s) orally once a day  CoQ10 100 mg oral capsule: 1 cap(s) orally once a day  DULoxetine 30 mg oral delayed release capsule: 1 cap(s) orally once a day  losartan 25 mg oral tablet: 1 tab(s) orally once a day  naproxen 250 mg oral tablet: 1 tab(s) orally 2 times a day  Ocuvite oral tablet: 1 tab(s) orally once a day  Vitamin D3 25 mcg (1000 intl units) oral tablet: 1 tab(s) orally once a day   acetaminophen 325 mg oral tablet: 2 tab(s) orally every 6 hours As needed Mild Pain (1 - 3)  aspirin 81 mg oral tablet: orally once a day  atorvastatin 40 mg oral tablet: 1 tab(s) orally once a day  cefuroxime 500 mg oral tablet: 1 tab(s) orally 2 times a day  CoQ10 100 mg oral capsule: 1 cap(s) orally once a day  DULoxetine 30 mg oral delayed release capsule: 1 cap(s) orally once a day  gabapentin 100 mg oral capsule: 1 cap(s) orally 3 times a day MDD: 3 TABS  midodrine 5 mg oral tablet: 1 tab(s) orally every 8 hours HOLD FOR SBP&gt;130  naproxen 250 mg oral tablet: 1 tab(s) orally 2 times a day as needed for  moderate pain  Ocuvite oral tablet: 1 tab(s) orally once a day  traMADol 50 mg oral tablet: 0.5 tab(s) orally every 6 hours as needed for Moderate Pain (4 - 6) or Severe Pain (7 - 10) MDD: 2 TABS  Vitamin D3 25 mcg (1000 intl units) oral tablet: 1 tab(s) orally once a day

## 2024-03-21 VITALS
SYSTOLIC BLOOD PRESSURE: 114 MMHG | DIASTOLIC BLOOD PRESSURE: 63 MMHG | OXYGEN SATURATION: 96 % | HEART RATE: 75 BPM | RESPIRATION RATE: 18 BRPM | TEMPERATURE: 98 F

## 2024-03-21 LAB
CULTURE RESULTS: ABNORMAL
SPECIMEN SOURCE: SIGNIFICANT CHANGE UP

## 2024-03-21 RX ADMIN — HEPARIN SODIUM 5000 UNIT(S): 5000 INJECTION INTRAVENOUS; SUBCUTANEOUS at 09:40

## 2024-03-21 RX ADMIN — LIDOCAINE 1 PATCH: 4 CREAM TOPICAL at 09:41

## 2024-03-21 RX ADMIN — Medication 2000 MILLIGRAM(S): at 09:40

## 2024-03-21 RX ADMIN — Medication 81 MILLIGRAM(S): at 09:40

## 2024-03-21 NOTE — PROGRESS NOTE ADULT - REASON FOR ADMISSION
unwitnessed fall

## 2024-03-21 NOTE — PROGRESS NOTE ADULT - ASSESSMENT
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 The patient was found on floor, denied loss of conscious or head strike. Patient was on aspirin. Patient had pain in his pelvis. Trauma workup revealed CT showing L acetabular fracture with inferior pubic rami fracture, no blush, noted L retroperitoneum hematoma. Initial CBC Hbg 5.9 received 1pRBC/1plt/1FFP on arrival in ED, then further tx'd 2 pRBCs. He was felt to have acute blood loss anemia secondary to pelvic hemorrhage. Treatment for his fracture was considered, but no acute surgical procedure is contemplated. On 3/16 he was reported with bacteremia. Concern of an infectious process was raised.    1. Bacteremia with CNST and STAU. Pelvic hemorrhage. Anemia. Left hip acetabular fracture. CRF stage 3.   -cultures reviewed  -one BC bottle is showing CNST in 1/2 bottles  -repeat BC shows MSSA in 2/2 bottles  -repeat BC on 3/19 show GPC in clusters in 1/2 bottles  -source of bacteremia are likely the multiple skin breaks   -no clinical signs of sepsis  -ortho evaluation appreciated  -s/p vancomycin 750 mg IV q12h # 3  -on cefazolin 2 gm IV q12h # 2  -tolerating abx well so far; no side effects noted  -change abx to ceftin 500 mg PO q12h for 14 more days  -f/u cultures  -monitor temps  -f/u CBC  -supportive care  2. Other issues:   -care per medicine    May change abx to oral ceftin 500 mg PO q12h x 14 days

## 2024-03-21 NOTE — DISCHARGE NOTE NURSING/CASE MANAGEMENT/SOCIAL WORK - PATIENT PORTAL LINK FT
You can access the FollowMyHealth Patient Portal offered by [facility] by registering at the following website: [email or web address]. By joining [employer]’s FollowMyHealth portal, you will also be able to view your health information using other applications (apps) compatible with our system.

## 2024-03-21 NOTE — PROGRESS NOTE ADULT - SUBJECTIVE AND OBJECTIVE BOX
Date of service: 03-21-24 @ 08:51    Lying in bed in NAD  Alert and confused  No fever  Poor PO intake    ROS: no fever or chills; limited    MEDICATIONS  (STANDING):  aspirin  chewable 81 milliGRAM(s) Oral daily  atorvastatin 40 milliGRAM(s) Oral at bedtime  ceFAZolin  Injectable. 2000 milliGRAM(s) IV Push every 12 hours  gabapentin 100 milliGRAM(s) Oral three times a day  heparin   Injectable 5000 Unit(s) SubCutaneous every 12 hours  lidocaine   4% Patch 1 Patch Transdermal daily  midodrine 5 milliGRAM(s) Oral every 8 hours  sodium chloride 0.9%. 1000 milliLiter(s) (65 mL/Hr) IV Continuous <Continuous>    Vital Signs Last 24 Hrs  T(C): 36.9 (21 Mar 2024 08:22), Max: 36.9 (21 Mar 2024 08:22)  T(F): 98.5 (21 Mar 2024 08:22), Max: 98.5 (21 Mar 2024 08:22)  HR: 75 (21 Mar 2024 08:22) (56 - 90)  BP: 114/63 (21 Mar 2024 08:22) (111/61 - 114/63)  BP(mean): --  RR: 18 (21 Mar 2024 08:22) (18 - 18)  SpO2: 96% (21 Mar 2024 08:22) (96% - 96%)    Parameters below as of 21 Mar 2024 08:22  Patient On (Oxygen Delivery Method): room air    Physical exam:    Constitutional:  No acute distress  HEENT: NC/AT, EOMI, PERRLA, conjunctivae clear; ears and nose atraumatic  Neck: supple; thyroid not palpable  Back: no tenderness  Respiratory: respiratory effort normal; clear to auscultation  Cardiovascular: S1S2 regular, no murmurs  Abdomen: soft, not tender, not distended, positive BS  Genitourinary: no suprapubic tenderness  Lymphatic: no LN palpable  Musculoskeletal: no muscle tenderness, no joint swelling or tenderness  Has left hip pain  Extremities: no pedal edema  multiple bruises and skin breaks on forearms and arms  Neurological/ Psychiatric: Alert, moving all extremities  Skin: no rashes; no palpable lesions    Labs: reviewed                        9.0    6.24  )-----------( 131      ( 19 Mar 2024 06:34 )             29.1     03-19    145  |  114<H>  |  28<H>  ----------------------------<  100<H>  3.8   |  24  |  1.04    Ca    8.5      19 Mar 2024 06:34  Phos  3.0     03-19  Mg     2.2     03-19    Vancomycin Level, Trough: 14.7 ug/mL (03-20 @ 09:34)  Vancomycin Level, Trough: 16.8 ug/mL (03-19 @ 19:46)                        8.4    5.65  )-----------( 105      ( 17 Mar 2024 06:05 )             26.2     03-17    144  |  114<H>  |  31<H>  ----------------------------<  98  3.7   |  25  |  1.02    Ca    8.4<L>      17 Mar 2024 06:05  Phos  1.6     03-17  Mg     2.5     03-17    Culture - Blood (collected 19 Mar 2024 06:34)  Source: .Blood None  Preliminary Report (20 Mar 2024 13:03):    No growth at 24 hours    Culture - Blood (collected 19 Mar 2024 06:34)  Source: .Blood None  Gram Stain (20 Mar 2024 09:57):    Growth in anaerobic bottle: Gram Positive Cocci in Clusters  Final Report (21 Mar 2024 07:33):    Growth in anaerobic bottle: Staphylococcus aureus    See previous culture [id number]    Culture - Blood (collected 17 Mar 2024 13:03)  Source: .Blood None  Gram Stain (18 Mar 2024 18:13):    Growth in aerobic bottle: Gram Positive Cocci in Clusters    Growth in anaerobic bottle: Gram Positive Cocci in Clusters  Final Report (20 Mar 2024 07:58):    Growth in aerobic and anaerobic bottles: Staphylococcus aureus  Organism: Staphylococcus aureus (20 Mar 2024 07:58)  Organism: Staphylococcus aureus (20 Mar 2024 07:58)      -  Clindamycin: S 0.5      -  Oxacillin: S 1 Oxacillin predicts susceptibility for dicloxacillin, methicillin, and nafcillin      -  Gentamicin: S <=1 Should not be used as monotherapy      -  Cefazolin: S <=4      -  Vancomycin: S 2      -  Tetracycline: S <=1      Method Type: JANINE      -  Ampicillin/Sulbactam: S <=8/4      -  Penicillin: R >8      -  Rifampin: S <=1 Should not be used as monotherapy      -  Erythromycin: R >4      -  Trimethoprim/Sulfamethoxazole: S <=0.5/9.5    Culture - Urine (collected 14 Mar 2024 16:25)  Source: Clean Catch None  Final Report (15 Mar 2024 23:56):    No growth    Culture - Blood (collected 14 Mar 2024 15:40)  Source: .Blood None  Final Report (19 Mar 2024 23:00):    No growth at 5 days    Culture - Blood (collected 14 Mar 2024 15:08)  Source: .Blood Blood-Venous  Gram Stain (16 Mar 2024 22:34):    Growth in anaerobic bottle: Gram positive cocci in pairs  Final Report (17 Mar 2024 15:29):    Growth in anaerobic bottle: Staphylococcus capitis    Isolation of Coagulase negative Staphylococcus from single blood culture    sets may represent    contamination. Contact the Microbiology Department at [phone] if    susceptibility testing is    clinically indicated.    Direct identification is available within approximately 3-5    hours either by Blood Panel Multiplexed PCR or Direct    MALDI-TOF. Details: [email or web address]  Organism: Blood Culture PCR (17 Mar 2024 15:29)  Organism: Blood Culture PCR (17 Mar 2024 15:29)      -  Coagulase negative Staphylococcus: Detec      Method Type: PCR    Radiology: all available radiological tests reviewed    < from: Xray Pelvis AP only (03.15.24 @ 09:30) >  IMPRESSION:  Comminuted impacted fracture of the left acetabulum with protrusion of the femoral head medially into the pelvis.  No definite fracture of the left femur were knee.    There is questionable linear lucency in the distal fibula however   overlying bandages and splint limiting evaluation. Recommend dedicated films without the splint when the patient is able.  < end of copied text >      Advanced directives addressed: full resuscitation

## 2024-03-21 NOTE — PROGRESS NOTE ADULT - PROVIDER SPECIALTY LIST ADULT
Hospitalist
Infectious Disease
Orthopedics
Trauma Surgery
Orthopedics
Palliative Care
Trauma Surgery
Hospitalist
Trauma Surgery
Cardiology
Cardiology
Critical Care
Critical Care
Infectious Disease
Trauma Surgery
Critical Care
Hospitalist
Palliative Care
Trauma Surgery
Critical Care
Cardiology

## 2024-03-22 LAB
CORTICOSTEROID BINDING GLOBULIN RESULT: 1.7 MG/DL — SIGNIFICANT CHANGE UP
CORTIS F/TOTAL MFR SERPL: 56 % — SIGNIFICANT CHANGE UP
CORTIS SERPL-MCNC: 26 UG/DL — HIGH
CORTISOL, FREE RESULT: 15 UG/DL — HIGH

## 2024-03-24 LAB
CULTURE RESULTS: SIGNIFICANT CHANGE UP
SPECIMEN SOURCE: SIGNIFICANT CHANGE UP
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
## 2024-03-27 DIAGNOSIS — Z79.82 LONG TERM (CURRENT) USE OF ASPIRIN: ICD-10-CM

## 2024-03-27 DIAGNOSIS — Z11.52 ENCOUNTER FOR SCREENING FOR COVID-19: ICD-10-CM

## 2024-03-27 DIAGNOSIS — I25.10 ATHEROSCLEROTIC HEART DISEASE OF NATIVE CORONARY ARTERY WITHOUT ANGINA PECTORIS: ICD-10-CM

## 2024-03-27 DIAGNOSIS — N18.30 CHRONIC KIDNEY DISEASE, STAGE 3 UNSPECIFIED: ICD-10-CM

## 2024-03-27 DIAGNOSIS — I73.9 PERIPHERAL VASCULAR DISEASE, UNSPECIFIED: ICD-10-CM

## 2024-03-27 DIAGNOSIS — D62 ACUTE POSTHEMORRHAGIC ANEMIA: ICD-10-CM

## 2024-03-27 DIAGNOSIS — E78.5 HYPERLIPIDEMIA, UNSPECIFIED: ICD-10-CM

## 2024-03-27 DIAGNOSIS — S32.402A UNSPECIFIED FRACTURE OF LEFT ACETABULUM, INITIAL ENCOUNTER FOR CLOSED FRACTURE: ICD-10-CM

## 2024-03-27 DIAGNOSIS — R78.81 BACTEREMIA: ICD-10-CM

## 2024-03-27 DIAGNOSIS — N17.9 ACUTE KIDNEY FAILURE, UNSPECIFIED: ICD-10-CM

## 2024-03-27 DIAGNOSIS — R57.8 OTHER SHOCK: ICD-10-CM

## 2024-03-27 DIAGNOSIS — E43 UNSPECIFIED SEVERE PROTEIN-CALORIE MALNUTRITION: ICD-10-CM

## 2024-03-27 DIAGNOSIS — Z66 DO NOT RESUSCITATE: ICD-10-CM

## 2024-03-27 DIAGNOSIS — I21.4 NON-ST ELEVATION (NSTEMI) MYOCARDIAL INFARCTION: ICD-10-CM

## 2024-03-27 DIAGNOSIS — B95.61 METHICILLIN SUSCEPTIBLE STAPHYLOCOCCUS AUREUS INFECTION AS THE CAUSE OF DISEASES CLASSIFIED ELSEWHERE: ICD-10-CM

## 2024-03-27 DIAGNOSIS — I12.9 HYPERTENSIVE CHRONIC KIDNEY DISEASE WITH STAGE 1 THROUGH STAGE 4 CHRONIC KIDNEY DISEASE, OR UNSPECIFIED CHRONIC KIDNEY DISEASE: ICD-10-CM

## 2024-03-27 DIAGNOSIS — F03.90 UNSPECIFIED DEMENTIA, UNSPECIFIED SEVERITY, WITHOUT BEHAVIORAL DISTURBANCE, PSYCHOTIC DISTURBANCE, MOOD DISTURBANCE, AND ANXIETY: ICD-10-CM

## 2024-03-27 DIAGNOSIS — Z95.1 PRESENCE OF AORTOCORONARY BYPASS GRAFT: ICD-10-CM
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
## 2024-03-28 ENCOUNTER — TRANSCRIPTION ENCOUNTER (OUTPATIENT)
Age: 89
End: 2024-03-28